# Patient Record
Sex: FEMALE | Race: WHITE | NOT HISPANIC OR LATINO | Employment: OTHER | URBAN - METROPOLITAN AREA
[De-identification: names, ages, dates, MRNs, and addresses within clinical notes are randomized per-mention and may not be internally consistent; named-entity substitution may affect disease eponyms.]

---

## 2017-11-12 ENCOUNTER — OFFICE VISIT (OUTPATIENT)
Dept: URGENT CARE | Facility: CLINIC | Age: 57
End: 2017-11-12
Payer: COMMERCIAL

## 2017-11-12 VITALS
BODY MASS INDEX: 40.97 KG/M2 | OXYGEN SATURATION: 97 % | HEIGHT: 64 IN | RESPIRATION RATE: 18 BRPM | DIASTOLIC BLOOD PRESSURE: 64 MMHG | SYSTOLIC BLOOD PRESSURE: 118 MMHG | WEIGHT: 240 LBS | HEART RATE: 75 BPM | TEMPERATURE: 97 F

## 2017-11-12 DIAGNOSIS — B34.9 ACUTE VIRAL SYNDROME: Primary | ICD-10-CM

## 2017-11-12 LAB
CTP QC/QA: YES
FLUAV AG NPH QL: NEGATIVE
FLUBV AG NPH QL: NEGATIVE

## 2017-11-12 PROCEDURE — 99203 OFFICE O/P NEW LOW 30 MIN: CPT | Mod: 25,S$GLB,, | Performed by: EMERGENCY MEDICINE

## 2017-11-12 PROCEDURE — 96372 THER/PROPH/DIAG INJ SC/IM: CPT | Mod: S$GLB,,, | Performed by: EMERGENCY MEDICINE

## 2017-11-12 PROCEDURE — 87804 INFLUENZA ASSAY W/OPTIC: CPT | Mod: QW,S$GLB,, | Performed by: EMERGENCY MEDICINE

## 2017-11-12 RX ORDER — LEVOTHYROXINE SODIUM 112 UG/1
112 TABLET ORAL DAILY
COMMUNITY
End: 2021-04-13

## 2017-11-12 RX ORDER — BETAMETHASONE SODIUM PHOSPHATE AND BETAMETHASONE ACETATE 3; 3 MG/ML; MG/ML
6 INJECTION, SUSPENSION INTRA-ARTICULAR; INTRALESIONAL; INTRAMUSCULAR; SOFT TISSUE
Status: COMPLETED | OUTPATIENT
Start: 2017-11-12 | End: 2017-11-12

## 2017-11-12 RX ADMIN — BETAMETHASONE SODIUM PHOSPHATE AND BETAMETHASONE ACETATE 6 MG: 3; 3 INJECTION, SUSPENSION INTRA-ARTICULAR; INTRALESIONAL; INTRAMUSCULAR; SOFT TISSUE at 01:11

## 2017-11-12 NOTE — PROGRESS NOTES
"Subjective:       Patient ID: Sally Jane is a 57 y.o. female.    Vitals:  height is 5' 4" (1.626 m) and weight is 108.9 kg (240 lb). Her oral temperature is 96.9 °F (36.1 °C). Her blood pressure is 118/64 and her pulse is 75. Her respiration is 18 and oxygen saturation is 97%.     Chief Complaint: Sinus Problem    Few d hx sinus drainage/congestion/pressure/right ear ache, NOC.  States steroid IM works well.      Sinus Problem   This is a new problem. The current episode started in the past 7 days. The problem is unchanged. There has been no fever. Her pain is at a severity of 10/10. Associated symptoms include congestion, coughing, ear pain, headaches and sinus pressure. Pertinent negatives include no chills, hoarse voice, shortness of breath or sore throat. Past treatments include oral decongestants. The treatment provided no relief.     Review of Systems   Constitution: Negative for chills, fever and malaise/fatigue.   HENT: Positive for congestion, ear pain and sinus pressure. Negative for hoarse voice and sore throat.    Eyes: Negative for discharge and redness.   Cardiovascular: Negative for chest pain, dyspnea on exertion and leg swelling.   Respiratory: Positive for cough. Negative for shortness of breath, sputum production and wheezing.    Musculoskeletal: Negative for myalgias.   Gastrointestinal: Negative for abdominal pain and nausea.   Neurological: Positive for headaches.       Objective:      Physical Exam   Constitutional: She is oriented to person, place, and time.   Overweight   HENT:   Head: Normocephalic and atraumatic.   Right Ear: External ear and ear canal normal. A middle ear effusion is present.   Left Ear: Tympanic membrane, external ear and ear canal normal.   Nose: Mucosal edema present. No rhinorrhea. Right sinus exhibits no maxillary sinus tenderness and no frontal sinus tenderness. Left sinus exhibits no maxillary sinus tenderness and no frontal sinus tenderness.   Mouth/Throat: " Uvula is midline, oropharynx is clear and moist and mucous membranes are normal.   Eyes: EOM are normal. Pupils are equal, round, and reactive to light.   Neck: Normal range of motion. Neck supple.   Cardiovascular: Normal rate, regular rhythm and normal heart sounds.    Pulmonary/Chest: Breath sounds normal.   Musculoskeletal: Normal range of motion.   Lymphadenopathy:     She has no cervical adenopathy.   Neurological: She is alert and oriented to person, place, and time.   Skin: Skin is warm and dry.   Psychiatric: She has a normal mood and affect. Her behavior is normal.       Assessment:       1. Acute viral syndrome        Plan:         Acute viral syndrome  -     POCT Influenza A/B  -     betamethasone acetate-betamethasone sodium phosphate injection 6 mg; Inject 1 mL (6 mg total) into the muscle one time.      Florian Arteaga MD  Go to the Emergency Department for any problems

## 2017-11-12 NOTE — LETTER
November 12, 2017      [facility]  [address] [address], [address]  [place] [address]  Phone: [phone]  Fax: [phone]       Patient: Sally Jane   YOB: 1960  Date of Visit: 11/12/2017    To Whom It May Concern:    Nic Jane  was at [facility] on 11/12/2017. She may return to work/school on 11/14/17, earlier if feels better with no restrictions. If you have any questions or concerns, or if I can be of further assistance, please do not hesitate to contact me.    Sincerely,          Florian Arteaga MD

## 2017-11-12 NOTE — PATIENT INSTRUCTIONS
"  Viral Syndrome (Adult)  A viral illness may cause a number of symptoms. The symptoms depend on the part of the body that the virus affects. If it settles in your nose, throat, and lungs, it may cause cough, sore throat, congestion, and sometimes headache. If it settles in your stomach and intestinal tract, it may cause vomiting and diarrhea. Sometimes it causes vague symptoms like "aching all over," feeling tired, loss of appetite, or fever.  A viral illness usually lasts 1 to 2 weeks, but sometimes it lasts longer. In some cases, a more serious infection can look like a viral syndrome in the first few days of the illness. You may need another exam and additional tests to know the difference. Watch for the warning signs listed below.  Home care  Follow these guidelines for taking care of yourself at home:  · If symptoms are severe, rest at home for the first 2 to 3 days.  · Stay away from cigarette smoke - both your smoke and the smoke from others.  · You may use over-the-counter acetaminophen or ibuprofen for fever, muscle aching, and headache, unless another medicine was prescribed for this. If you have chronic liver or kidney disease or ever had a stomach ulcer or GI bleeding, talk with your doctor before using these medicines. No one who is younger than 18 and ill with a fever should take aspirin. It may cause severe disease or death.  · Your appetite may be poor, so a light diet is fine. Avoid dehydration by drinking 8 to 12 8-ounce glasses of fluids each day. This may include water; orange juice; lemonade; apple, grape, and cranberry juice; clear fruit drinks; electrolyte replacement and sports drinks; and decaffeinated teas and coffee. If you have been diagnosed with a kidney disease, ask your doctor how much and what types of fluids you should drink to prevent dehydration. If you have kidney disease, drinking too much fluid can cause it build up in the your body and be dangerous to your " health.  · Over-the-counter remedies won't shorten the length of the illness but may be helpful for cough, sore throat; and nasal and sinus congestion. Don't use decongestants if you have high blood pressure.  Follow-up care  Follow up with your healthcare provider if you do not improve over the next week.  Call 911  Get emergency medical care if any of the following occur:  · Convulsion  · Feeling weak, dizzy, or like you are going to faint  · Chest pain, shortness of breath, wheezing, or difficulty breathing  When to seek medical advice  Call your healthcare provider right away if any of these occur:  · Cough with lots of colored sputum (mucus) or blood in your sputum  · Chest pain, shortness of breath, wheezing, or difficulty breathing  · Severe headache; face, neck, or ear pain  · Severe, constant pain in the lower right side of your belly (abdominal)  · Continued vomiting (cant keep liquids down)  · Frequent diarrhea (more than 5 times a day); blood (red or black color) or mucus in diarrhea  · Feeling weak, dizzy, or like you are going to faint  · Extreme thirst  · Fever of 100.4°F (38°C) or higher, or as directed by your healthcare provider  Date Last Reviewed: 9/25/2015  © 5391-9975 [employer]. [address] [place] [address], [place] [address]. All rights reserved. This information is not intended as a substitute for professional medical care. Always follow your healthcare professional's instructions.        Sinusitis (No Antibiotics)    The sinuses are air-filled spaces within the bones of the face. They connect to the inside of the nose. Sinusitis is an inflammation of the tissue lining the sinus cavity. Sinus inflammation can occur during a cold. It can also be due to allergies to pollens and other particles in the air. It can cause symptoms such as sinus congestion, headache, sore throat, facial swelling and fullness. It may also cause a low-grade fever. No infection is present, and no antibiotic  treatment is needed.  Home care  · Drink plenty of water, hot tea, and other liquids. This may help thin mucus. It also may promote sinus drainage.  · Heat may help soothe painful areas of the face. Use a towel soaked in hot water. Or,  the shower and direct the hot spray onto your face. Using a vaporizer along with a menthol rub at night may also help.   · An expectorant containing guaifenesin may help thin the mucus and promote drainage from the sinuses.  · Over-the-counter decongestants may be used unless a similar medicine was prescribed. Nasal sprays work the fastest. Use one that contains phenylephrine or oxymetazoline. First blow the nose gently. Then use the spray. Do not use these medicines more often than directed on the label or symptoms may get worse. You may also use tablets containing pseudoephedrine. Avoid products that combine ingredients, because side effects may be increased. Read labels. You can also ask the pharmacist for help. (NOTE: Persons with high blood pressure should not use decongestants. They can raise blood pressure.)  · Over-the-counter antihistamines may help if allergies contributed to your sinusitis.    · Use acetaminophen or ibuprofen to control pain, unless another pain medicine was prescribed. (If you have chronic liver or kidney disease or ever had a stomach ulcer, talk with your doctor before using these medicines. Aspirin should never be used in anyone under 18 years of age who is ill with a fever. It may cause severe liver damage.)  · Use nasal rinses or irrigation as instructed by your health care provider.  · Don't smoke. This can worsen symptoms.  Follow-up care  Follow up with your healthcare provider or our staff if you are not improving within the next week.  When to seek medical advice  Call your healthcare provider if any of these occur:  · Green or yellow discharge from the nose or into the throat  · Facial pain or headache becoming more severe  · Stiff  neck  · Unusual drowsiness or confusion  · Swelling of the forehead or eyelids  · Vision problems, including blurred or double vision  · Fever of 100.4ºF (38ºC) or higher, or as directed by your healthcare provider  · Seizure  · Breathing problems  · Symptoms not resolving within 10 days  Date Last Reviewed: 4/13/2015  © 8961-4679 [employer]. [address] [place] [address], [place] [address]. All rights reserved. This information is not intended as a substitute for professional medical care. Always follow your healthcare professional's instructions.        Earache, No Infection (Adult)  Earaches can happen without an infection. This occurs when air and fluid build up behind the eardrum causing a feeling of fullness and discomfort and reduced hearing. This is called otitis media with effusion (OME) or serous otitis media. It means there is fluid in the middle ear. It is not the same as acute otitis media, which is typically from infection.  OME can happen when you have a cold if congestion blocks the passage that drains the middle ear. This passage is called the eustachian tube. OME may also occur with nasal allergies or after a bacterial middle ear infection.    The pain or discomfort may come and go. You may hear clicking or popping sounds when you chew or swallow. You may feel that your balance is off. Or you may hear ringing in the ear.  It often takes from several weeks up to 3 months for the fluid to clear on its own. Oral pain relievers and ear drops help if there is pain. Decongestants and antihistamines sometimes help. Antibiotics don't help since there is no infection. Your doctor may prescribe a nasal spray to help reduce swelling in the nose and eustachian tube. This can allow the ear to drain.  If your OME doesn't improve after 3 months, surgery may be used to drain the fluid and insert a small tube in the eardrum to allow continued drainage.  Because the middle ear fluid can become infected, it  is important to watch for signs of an ear infection which may develop later. These signs include increased ear pain, fever, or drainage from the ear.  Home care  The following guidelines will help you care for yourself at home:  · You may use over-the-counter medicine as directed to control pain, unless another medicine was prescribed. If you have chronic liver or kidney disease or ever had a stomach ulcer or GI bleeding, talk with your doctor before using these medicines. Aspirin should never be used in anyone under 18 years of age who is ill with a fever. It may cause severe liver damage.  · You may use over-the-counter decongestants such as phenylephrine or pseudoephedrine. But they are not always helpful. Don't use nasal spray decongestants more than 3 days. Longer use can make congestion worse. Prescription nasal sprays from your doctor don't typically have those restrictions.  · Antihistamines may help if you are also having allergy symptoms.  · You may use medicines such as guaifenesin to thin mucus and promote drainage.  Follow-up care  Follow up with your healthcare provider or as advised if you are not feeling better after 3 days.  When to seek medical advice  Call your healthcare provider right away if any of the following occur:  · Your ear pain gets worse or does not start to improve   · Fever of 100.4°F (38°C) or higher, or as directed by your healthcare provider  · Fluid or blood draining from the ear  · Headache or sinus pain  · Stiff neck  · Unusual drowsiness or confusion  Date Last Reviewed: 10/1/2016  © 3303-0744 [employer]. [address] [place] [address], [place] [address]. All rights reserved. This information is not intended as a substitute for professional medical care. Always follow your healthcare professional's instructions.      Florian Arteaga MD  Go to the Emergency Department for any problems

## 2017-11-15 ENCOUNTER — TELEPHONE (OUTPATIENT)
Dept: URGENT CARE | Facility: CLINIC | Age: 57
End: 2017-11-15

## 2019-08-28 ENCOUNTER — OFFICE VISIT (OUTPATIENT)
Dept: URGENT CARE | Facility: CLINIC | Age: 59
End: 2019-08-28
Payer: COMMERCIAL

## 2019-08-28 VITALS
RESPIRATION RATE: 16 BRPM | BODY MASS INDEX: 29.02 KG/M2 | SYSTOLIC BLOOD PRESSURE: 120 MMHG | DIASTOLIC BLOOD PRESSURE: 67 MMHG | HEART RATE: 64 BPM | HEIGHT: 64 IN | TEMPERATURE: 98 F | WEIGHT: 170 LBS

## 2019-08-28 DIAGNOSIS — L30.9 DERMATITIS: Primary | ICD-10-CM

## 2019-08-28 PROCEDURE — 96372 THER/PROPH/DIAG INJ SC/IM: CPT | Mod: S$GLB,,, | Performed by: NURSE PRACTITIONER

## 2019-08-28 PROCEDURE — 99214 PR OFFICE/OUTPT VISIT, EST, LEVL IV, 30-39 MIN: ICD-10-PCS | Mod: 25,S$GLB,, | Performed by: NURSE PRACTITIONER

## 2019-08-28 PROCEDURE — 96372 PR INJECTION,THERAP/PROPH/DIAG2ST, IM OR SUBCUT: ICD-10-PCS | Mod: S$GLB,,, | Performed by: NURSE PRACTITIONER

## 2019-08-28 PROCEDURE — 99214 OFFICE O/P EST MOD 30 MIN: CPT | Mod: 25,S$GLB,, | Performed by: NURSE PRACTITIONER

## 2019-08-28 PROCEDURE — 3008F PR BODY MASS INDEX (BMI) DOCUMENTED: ICD-10-PCS | Mod: CPTII,S$GLB,, | Performed by: NURSE PRACTITIONER

## 2019-08-28 PROCEDURE — 3008F BODY MASS INDEX DOCD: CPT | Mod: CPTII,S$GLB,, | Performed by: NURSE PRACTITIONER

## 2019-08-28 RX ORDER — CHLORHEXIDINE GLUCONATE ORAL RINSE 1.2 MG/ML
SOLUTION DENTAL
Refills: 2 | COMMUNITY
Start: 2019-07-25 | End: 2021-04-13

## 2019-08-28 RX ORDER — VIT C/E/ZN/COPPR/LUTEIN/ZEAXAN 250MG-90MG
1000 CAPSULE ORAL EVERY MORNING
COMMUNITY
End: 2021-05-24

## 2019-08-28 RX ORDER — PREDNISONE 20 MG/1
TABLET ORAL
Qty: 9 TABLET | Refills: 0 | Status: SHIPPED | OUTPATIENT
Start: 2019-08-28 | End: 2019-09-04

## 2019-08-28 RX ORDER — LEVOTHYROXINE SODIUM 75 UG/1
75 TABLET ORAL
Refills: 3 | COMMUNITY
Start: 2019-07-24 | End: 2021-08-20 | Stop reason: SDUPTHER

## 2019-08-28 RX ORDER — FERROUS SULFATE, DRIED 160(50) MG
1 TABLET, EXTENDED RELEASE ORAL EVERY MORNING
COMMUNITY
End: 2021-08-20

## 2019-08-28 RX ORDER — DEXAMETHASONE SODIUM PHOSPHATE 100 MG/10ML
6 INJECTION INTRAMUSCULAR; INTRAVENOUS
Status: COMPLETED | OUTPATIENT
Start: 2019-08-28 | End: 2019-08-28

## 2019-08-28 RX ADMIN — DEXAMETHASONE SODIUM PHOSPHATE 6 MG: 100 INJECTION INTRAMUSCULAR; INTRAVENOUS at 05:08

## 2019-08-28 NOTE — PROGRESS NOTES
"Subjective:       Patient ID: Sally Jane is a 59 y.o. female.    Vitals:  height is 5' 4" (1.626 m) and weight is 77.1 kg (170 lb). Her oral temperature is 98.2 °F (36.8 °C). Her blood pressure is 120/67 and her pulse is 64. Her respiration is 16.     Chief Complaint: Rash    Patient states she was working in yard Saturday and noticed a rash on buttocks later. Rash seems to be spreading since yesterday.    Rash   This is a new problem. The current episode started in the past 7 days. The problem has been gradually worsening since onset. The affected locations include the right arm, left arm and abdomen (buttocks). The rash is characterized by redness, swelling and itchiness. It is unknown if there was an exposure to a precipitant. Associated symptoms include congestion. Pertinent negatives include no cough, diarrhea, fatigue, fever, shortness of breath, sore throat or vomiting. Past treatments include anti-itch cream (zyrtec). The treatment provided mild relief. There is no history of allergies.       Constitution: Negative for chills, fatigue and fever.   HENT: Positive for congestion. Negative for sore throat.    Neck: Negative for painful lymph nodes.   Cardiovascular: Negative for chest pain and leg swelling.   Eyes: Negative for double vision and blurred vision.   Respiratory: Negative for cough and shortness of breath.    Gastrointestinal: Negative for nausea, vomiting and diarrhea.   Genitourinary: Negative for dysuria, frequency, urgency and history of kidney stones.   Musculoskeletal: Negative for joint pain, joint swelling, muscle cramps and muscle ache.   Skin: Positive for rash. Negative for color change, pale, erythema and bruising.   Allergic/Immunologic: Negative for seasonal allergies.   Neurological: Negative for dizziness, history of vertigo, light-headedness, passing out and headaches.   Hematologic/Lymphatic: Negative for swollen lymph nodes.   Psychiatric/Behavioral: Negative for " nervous/anxious, sleep disturbance and depression. The patient is not nervous/anxious.        Objective:      Physical Exam   Constitutional: She is oriented to person, place, and time. She appears well-developed and well-nourished.   HENT:   Head: Normocephalic and atraumatic. Head is without abrasion, without contusion and without laceration.   Right Ear: External ear normal.   Left Ear: External ear normal.   Nose: Nose normal.   Mouth/Throat: Oropharynx is clear and moist.   Eyes: Pupils are equal, round, and reactive to light. Conjunctivae, EOM and lids are normal.   Neck: Trachea normal, full passive range of motion without pain and phonation normal. Neck supple.   Cardiovascular: Normal rate, regular rhythm, S1 normal, S2 normal and normal heart sounds.  No extrasystoles are present.   Pulmonary/Chest: Effort normal and breath sounds normal. No stridor. No respiratory distress.   Musculoskeletal: Normal range of motion.   Lymphadenopathy:     She has no cervical adenopathy.        Right cervical: No superficial cervical, no deep cervical and no posterior cervical adenopathy present.       Left cervical: No superficial cervical, no deep cervical and no posterior cervical adenopathy present.   Neurological: She is alert and oriented to person, place, and time.   Skin: Skin is warm, dry and intact. Capillary refill takes less than 2 seconds. Rash (trunk, bilateral lower arms, neck) noted. No abrasion, no bruising, no burn, no ecchymosis, no laceration and no lesion noted. Rash is maculopapular. No erythema.   Psychiatric: She has a normal mood and affect. Her speech is normal and behavior is normal. Judgment and thought content normal. Cognition and memory are normal.   Nursing note and vitals reviewed.      Assessment:       No diagnosis found.    Plan:         There are no diagnoses linked to this encounter.

## 2019-08-28 NOTE — PATIENT INSTRUCTIONS
Please follow up with your Primary care provider within 2-5 days if your signs and symptoms have not resolved or worsen.     If your condition worsens or fails to improve we recommend that you receive another evaluation at the emergency room immediately or contact your primary medical clinic to discuss your concerns.    You must understand that you have received an Urgent Care treatment only and that you may be released before all of your medical problems are known or treated.   You, the patient, will arrange for follow up care as instructed.       Skin Rash    If your condition worsens or fails to improve we recommend that you receive another evaluation at the ER immediately or contact your PCP to discuss your concerns or return here.     If you develop additional symptoms such as tongue, mouth or throat swelling or any  trouble breathing go immediately to the ER or call 911.     You must understand that you've received an urgent care treatment only and that you may be released before all your medical problems are known or treated. You the patient will arrange for followup care as instructed.     Zyrtec, Claritin, Xyzal or Allegra should be used daily for the next 7-10 days to prevent or suppress the itching. You can take Benedryl 25 mg at bedtime as well.     Steroid Use    If you had a steroid injection in the clinic and you were given an oral steroid today, do not start the oral prescription until tomorrow.     As discussed, steroid use can elevate your blood pressure, elevate your blood sugar, water weight gain, nervous energy, redness to the face and dimpling of the skin at the injection site.     Oral steroids are hard on the stomach and should always be taken with food.      Once starting a steroid pack, you should complete it as directed.  If you abruptly discontinue the steroid, contact your PCP prior to doing this.      Please do not scratch the areas as they can become infected.  If you notice puss,  fever, redness, increased warmth or red streaking from the area, please see your PCP or Urgent care as soon as possible.          Nonspecific Dermatitis  Dermatitis is a skin rash caused by something that touches the skin and makes it irritated and inflamed.  Your skin may be red, swollen, dry, and may be cracked. Blisters may form and ooze. The rash will itch.  Dermatitis can form on the face and neck, backs of hands, forearms, genitals, and lower legs. Dermatitis is not passed from person to person.  Talk with your health care provider about what may have caused the rash. Common things that cause skin allergies are metal in jewelry, plants like poison ivy or poison oak, and certain skin care products. You will need to avoid the source of your rash in the future to prevent it from coming back. In some cases, the cause of the dermatitis may not be found.  Treatment is done to relieve itching and prevent the rash from coming back. The rash should go away in a few days to a few weeks.  Home care  The health care provider may prescribe medications to relieve swelling and itching. Follow all instructions when using these medications.  · Avoid anything that heats up your skin, such as hot showers or baths, or direct sunlight. This can make itching worse.  · Stay away from whatever you think caused the rash.  · Bathe in warm, not hot, water. Apply a moisturizing lotion after bathing to prevent dry skin.  · Avoid skin irritants such as wool or silk clothing, grease, oils, harsh soaps, and detergents.  · Apply cold compresses to soothe your sores to help relieve your symptoms. Do this for 30 minutes 3 to 4 times a day. You can make a cold compress by soaking a cloth in cold water. Squeeze out excess water. You can add colloidal oatmeal to the water to help reduce itching. For severe itching in a small area, apply an ice pack wrapped in a thin towel. Do this for 20 minutes 3 to 4 times a day.  · You can also help relieve  large areas of itching by taking a lukewarm bath with colloidal oatmeal added to the water.  · Use hydrocortisone cream for redness and irritation, unless another medicine was prescribed. You can also use benzocaine anesthetic cream or spray.  · Use oral diphenhydramine to help reduce itching. This is an antihistamine you can buy at drug and grocery stores. It can make you sleepy, so use lower doses during the daytime. Or you can use loratadine. This is an antihistamine that will not make you sleepy. Dont use diphenhydramine if you have glaucoma or have trouble urinating because of an enlarged prostate.  · Wash your hands or use an antibacterial gel often to prevent the spread of the rash.  Follow-up care  Follow up with your health care provider. Make an appointment with your health care provider if your symptoms do not get better in the next 1 to 2 weeks.  When to seek medical advice  Call your health care provider right away if any of these occur:  · Spreading of the rash to other parts of your body  · Severe swelling of your face, eyelids, mouth, throat or tongue  · Trouble urinating due to swelling in the genital area  · Fever of 100.4°F (38°C) or higher  · Redness or swelling that gets worse  · Pain that gets worse  · Foul-smelling fluid leaking from the skin  · Yellow-brown crusts on the open blisters  · Joint pain   Date Last Reviewed: 7/23/2014  © 8027-1132 [employer]. [address] [place] [address], [place] [id number]. All rights reserved. This information is not intended as a substitute for professional medical care. Always follow your healthcare professional's instructions.

## 2019-08-31 ENCOUNTER — TELEPHONE (OUTPATIENT)
Dept: URGENT CARE | Facility: CLINIC | Age: 59
End: 2019-08-31

## 2019-08-31 NOTE — TELEPHONE ENCOUNTER
Call back [employer] 8/28/2019- Woman stated that she is no longer employed there and the number we have is her employment number. No other number listed.

## 2019-09-14 ENCOUNTER — OFFICE VISIT (OUTPATIENT)
Dept: URGENT CARE | Facility: CLINIC | Age: 59
End: 2019-09-14
Payer: COMMERCIAL

## 2019-09-14 VITALS
HEART RATE: 68 BPM | DIASTOLIC BLOOD PRESSURE: 70 MMHG | SYSTOLIC BLOOD PRESSURE: 128 MMHG | BODY MASS INDEX: 29.02 KG/M2 | OXYGEN SATURATION: 98 % | HEIGHT: 64 IN | TEMPERATURE: 98 F | WEIGHT: 170 LBS

## 2019-09-14 DIAGNOSIS — L30.9 DERMATITIS: Primary | ICD-10-CM

## 2019-09-14 PROCEDURE — 99214 PR OFFICE/OUTPT VISIT, EST, LEVL IV, 30-39 MIN: ICD-10-PCS | Mod: S$GLB,,, | Performed by: PHYSICIAN ASSISTANT

## 2019-09-14 PROCEDURE — 3008F BODY MASS INDEX DOCD: CPT | Mod: CPTII,S$GLB,, | Performed by: PHYSICIAN ASSISTANT

## 2019-09-14 PROCEDURE — 3008F PR BODY MASS INDEX (BMI) DOCUMENTED: ICD-10-PCS | Mod: CPTII,S$GLB,, | Performed by: PHYSICIAN ASSISTANT

## 2019-09-14 PROCEDURE — 99214 OFFICE O/P EST MOD 30 MIN: CPT | Mod: S$GLB,,, | Performed by: PHYSICIAN ASSISTANT

## 2019-09-14 RX ORDER — HYDROXYZINE HYDROCHLORIDE 25 MG/1
25 TABLET, FILM COATED ORAL 3 TIMES DAILY PRN
Qty: 15 TABLET | Refills: 0 | Status: SHIPPED | OUTPATIENT
Start: 2019-09-14 | End: 2019-09-19

## 2019-09-14 RX ORDER — TRIAMCINOLONE ACETONIDE 1 MG/G
OINTMENT TOPICAL 2 TIMES DAILY
Qty: 1 BOTTLE | Refills: 0 | Status: SHIPPED | OUTPATIENT
Start: 2019-09-14 | End: 2019-09-14 | Stop reason: CLARIF

## 2019-09-14 RX ORDER — HYDROXYZINE HYDROCHLORIDE 25 MG/1
25 TABLET, FILM COATED ORAL 3 TIMES DAILY PRN
Qty: 15 TABLET | Refills: 0 | Status: SHIPPED | OUTPATIENT
Start: 2019-09-14 | End: 2019-09-14

## 2019-09-14 RX ORDER — TRIAMCINOLONE ACETONIDE 1 MG/G
OINTMENT TOPICAL 2 TIMES DAILY
Qty: 1 BOTTLE | Refills: 0 | Status: SHIPPED | OUTPATIENT
Start: 2019-09-14 | End: 2021-04-13

## 2019-09-14 RX ORDER — TRIAMCINOLONE ACETONIDE 1 MG/G
OINTMENT TOPICAL 2 TIMES DAILY
Qty: 1 BOTTLE | Refills: 0 | Status: SHIPPED | OUTPATIENT
Start: 2019-09-14 | End: 2019-09-14

## 2019-09-14 NOTE — PATIENT INSTRUCTIONS
Nonspecific Dermatitis  Dermatitis is a skin rash caused by something that touches the skin and makes it irritated and inflamed.  Your skin may be red, swollen, dry, and may be cracked. Blisters may form and ooze. The rash will itch.  Dermatitis can form on the face and neck, backs of hands, forearms, genitals, and lower legs. Dermatitis is not passed from person to person.  Talk with your health care provider about what may have caused the rash. Common things that cause skin allergies are metal in jewelry, plants like poison ivy or poison oak, and certain skin care products. You will need to avoid the source of your rash in the future to prevent it from coming back. In some cases, the cause of the dermatitis may not be found.  Treatment is done to relieve itching and prevent the rash from coming back. The rash should go away in a few days to a few weeks.  Home care  The health care provider may prescribe medications to relieve swelling and itching. Follow all instructions when using these medications.  · Avoid anything that heats up your skin, such as hot showers or baths, or direct sunlight. This can make itching worse.  · Stay away from whatever you think caused the rash.  · Bathe in warm, not hot, water. Apply a moisturizing lotion after bathing to prevent dry skin.  · Avoid skin irritants such as wool or silk clothing, grease, oils, harsh soaps, and detergents.  · Apply cold compresses to soothe your sores to help relieve your symptoms. Do this for 30 minutes 3 to 4 times a day. You can make a cold compress by soaking a cloth in cold water. Squeeze out excess water. You can add colloidal oatmeal to the water to help reduce itching. For severe itching in a small area, apply an ice pack wrapped in a thin towel. Do this for 20 minutes 3 to 4 times a day.  · You can also help relieve large areas of itching by taking a lukewarm bath with colloidal oatmeal added to the water.  · Use hydrocortisone cream for redness  and irritation, unless another medicine was prescribed. You can also use benzocaine anesthetic cream or spray.  · Use oral diphenhydramine to help reduce itching. This is an antihistamine you can buy at drug and grocery stores. It can make you sleepy, so use lower doses during the daytime. Or you can use loratadine. This is an antihistamine that will not make you sleepy. Dont use diphenhydramine if you have glaucoma or have trouble urinating because of an enlarged prostate.  · Wash your hands or use an antibacterial gel often to prevent the spread of the rash.  Follow-up care  Follow up with your health care provider. Make an appointment with your health care provider if your symptoms do not get better in the next 1 to 2 weeks.  When to seek medical advice  Call your health care provider right away if any of these occur:  · Spreading of the rash to other parts of your body  · Severe swelling of your face, eyelids, mouth, throat or tongue  · Trouble urinating due to swelling in the genital area  · Fever of 100.4°F (38°C) or higher  · Redness or swelling that gets worse  · Pain that gets worse  · Foul-smelling fluid leaking from the skin  · Yellow-brown crusts on the open blisters  · Joint pain   Date Last Reviewed: 7/23/2014  © 9894-5478 [employer]. [address] [place] [address], [place] [address]. All rights reserved. This information is not intended as a substitute for professional medical care. Always follow your healthcare professional's instructions.
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
## 2021-03-18 ENCOUNTER — IMMUNIZATION (OUTPATIENT)
Dept: PRIMARY CARE CLINIC | Facility: CLINIC | Age: 61
End: 2021-03-18
Payer: COMMERCIAL

## 2021-03-18 DIAGNOSIS — Z23 NEED FOR VACCINATION: Primary | ICD-10-CM

## 2021-03-18 PROCEDURE — 91300 COVID-19, MRNA, LNP-S, PF, 30 MCG/0.3 ML DOSE VACCINE: CPT | Mod: S$GLB,,, | Performed by: FAMILY MEDICINE

## 2021-03-18 PROCEDURE — 91300 COVID-19, MRNA, LNP-S, PF, 30 MCG/0.3 ML DOSE VACCINE: ICD-10-PCS | Mod: S$GLB,,, | Performed by: FAMILY MEDICINE

## 2021-03-18 PROCEDURE — 0001A COVID-19, MRNA, LNP-S, PF, 30 MCG/0.3 ML DOSE VACCINE: CPT | Mod: CV19,S$GLB,, | Performed by: FAMILY MEDICINE

## 2021-03-18 PROCEDURE — 0001A COVID-19, MRNA, LNP-S, PF, 30 MCG/0.3 ML DOSE VACCINE: ICD-10-PCS | Mod: CV19,S$GLB,, | Performed by: FAMILY MEDICINE

## 2021-04-08 ENCOUNTER — IMMUNIZATION (OUTPATIENT)
Dept: PRIMARY CARE CLINIC | Facility: CLINIC | Age: 61
End: 2021-04-08
Payer: COMMERCIAL

## 2021-04-08 DIAGNOSIS — Z23 NEED FOR VACCINATION: Primary | ICD-10-CM

## 2021-04-08 PROCEDURE — 0002A COVID-19, MRNA, LNP-S, PF, 30 MCG/0.3 ML DOSE VACCINE: ICD-10-PCS | Mod: CV19,S$GLB,, | Performed by: FAMILY MEDICINE

## 2021-04-08 PROCEDURE — 91300 COVID-19, MRNA, LNP-S, PF, 30 MCG/0.3 ML DOSE VACCINE: CPT | Mod: S$GLB,,, | Performed by: FAMILY MEDICINE

## 2021-04-08 PROCEDURE — 91300 COVID-19, MRNA, LNP-S, PF, 30 MCG/0.3 ML DOSE VACCINE: ICD-10-PCS | Mod: S$GLB,,, | Performed by: FAMILY MEDICINE

## 2021-04-08 PROCEDURE — 0002A COVID-19, MRNA, LNP-S, PF, 30 MCG/0.3 ML DOSE VACCINE: CPT | Mod: CV19,S$GLB,, | Performed by: FAMILY MEDICINE

## 2021-04-13 ENCOUNTER — OFFICE VISIT (OUTPATIENT)
Dept: INTERNAL MEDICINE | Facility: CLINIC | Age: 61
End: 2021-04-13
Payer: COMMERCIAL

## 2021-04-13 VITALS
TEMPERATURE: 98 F | HEIGHT: 61 IN | WEIGHT: 198.88 LBS | BODY MASS INDEX: 37.55 KG/M2 | HEART RATE: 71 BPM | SYSTOLIC BLOOD PRESSURE: 128 MMHG | DIASTOLIC BLOOD PRESSURE: 78 MMHG

## 2021-04-13 DIAGNOSIS — R73.03 PREDIABETES: ICD-10-CM

## 2021-04-13 DIAGNOSIS — E03.9 HYPOTHYROIDISM, UNSPECIFIED TYPE: ICD-10-CM

## 2021-04-13 DIAGNOSIS — E66.01 CLASS 2 SEVERE OBESITY DUE TO EXCESS CALORIES WITH SERIOUS COMORBIDITY AND BODY MASS INDEX (BMI) OF 37.0 TO 37.9 IN ADULT: ICD-10-CM

## 2021-04-13 DIAGNOSIS — L24.7 IRRITANT CONTACT DERMATITIS DUE TO PLANTS, EXCEPT FOOD: Primary | ICD-10-CM

## 2021-04-13 DIAGNOSIS — Z98.84 BARIATRIC SURGERY STATUS: ICD-10-CM

## 2021-04-13 DIAGNOSIS — Z78.0 ENCOUNTER FOR OSTEOPOROSIS SCREENING IN ASYMPTOMATIC POSTMENOPAUSAL PATIENT: ICD-10-CM

## 2021-04-13 DIAGNOSIS — Z12.31 BREAST CANCER SCREENING BY MAMMOGRAM: ICD-10-CM

## 2021-04-13 DIAGNOSIS — R53.81 CHRONIC FATIGUE AND MALAISE: ICD-10-CM

## 2021-04-13 DIAGNOSIS — Z13.820 ENCOUNTER FOR OSTEOPOROSIS SCREENING IN ASYMPTOMATIC POSTMENOPAUSAL PATIENT: ICD-10-CM

## 2021-04-13 DIAGNOSIS — R53.82 CHRONIC FATIGUE AND MALAISE: ICD-10-CM

## 2021-04-13 PROBLEM — N81.10 CYSTOCELE WITH RECTOCELE: Status: ACTIVE | Noted: 2020-06-05

## 2021-04-13 PROBLEM — N81.6 CYSTOCELE WITH RECTOCELE: Status: ACTIVE | Noted: 2020-06-05

## 2021-04-13 PROBLEM — E66.812 CLASS 2 SEVERE OBESITY DUE TO EXCESS CALORIES WITH SERIOUS COMORBIDITY AND BODY MASS INDEX (BMI) OF 37.0 TO 37.9 IN ADULT: Status: ACTIVE | Noted: 2021-04-13

## 2021-04-13 PROCEDURE — 96372 THER/PROPH/DIAG INJ SC/IM: CPT | Mod: S$GLB,,, | Performed by: INTERNAL MEDICINE

## 2021-04-13 PROCEDURE — 99214 OFFICE O/P EST MOD 30 MIN: CPT | Mod: 25,S$GLB,, | Performed by: INTERNAL MEDICINE

## 2021-04-13 PROCEDURE — 96372 PR INJECTION,THERAP/PROPH/DIAG2ST, IM OR SUBCUT: ICD-10-PCS | Mod: S$GLB,,, | Performed by: INTERNAL MEDICINE

## 2021-04-13 PROCEDURE — 99214 PR OFFICE/OUTPT VISIT, EST, LEVL IV, 30-39 MIN: ICD-10-PCS | Mod: 25,S$GLB,, | Performed by: INTERNAL MEDICINE

## 2021-04-13 RX ORDER — TRIAMCINOLONE ACETONIDE 1 MG/G
OINTMENT TOPICAL 2 TIMES DAILY
Qty: 80 G | Refills: 3 | Status: SHIPPED | OUTPATIENT
Start: 2021-04-13 | End: 2021-05-24

## 2021-04-13 RX ORDER — DEXAMETHASONE SODIUM PHOSPHATE 4 MG/ML
4 INJECTION, SOLUTION INTRA-ARTICULAR; INTRALESIONAL; INTRAMUSCULAR; INTRAVENOUS; SOFT TISSUE ONCE
Status: COMPLETED | OUTPATIENT
Start: 2021-04-13 | End: 2021-04-13

## 2021-04-13 RX ORDER — DIPHENHYDRAMINE HCL 50 MG
50 CAPSULE ORAL EVERY 6 HOURS PRN
Qty: 30 CAPSULE | Refills: 0 | Status: SHIPPED | OUTPATIENT
Start: 2021-04-13 | End: 2021-05-24

## 2021-04-13 RX ORDER — METHYLPREDNISOLONE 4 MG/1
TABLET ORAL
Qty: 1 PACKAGE | Refills: 0 | Status: ON HOLD | OUTPATIENT
Start: 2021-04-13 | End: 2021-06-30 | Stop reason: HOSPADM

## 2021-04-13 RX ADMIN — DEXAMETHASONE SODIUM PHOSPHATE 4 MG: 4 INJECTION, SOLUTION INTRA-ARTICULAR; INTRALESIONAL; INTRAMUSCULAR; INTRAVENOUS; SOFT TISSUE at 09:04

## 2021-04-14 DIAGNOSIS — Z11.59 NEED FOR HEPATITIS C SCREENING TEST: ICD-10-CM

## 2021-05-05 LAB
HCV AB S/CO SERPL IA: 0.01
HCV AB SERPL QL IA: NORMAL
HIV 1+2 AB+HIV1 P24 AG SERPL QL IA: NORMAL

## 2021-05-20 ENCOUNTER — OFFICE VISIT (OUTPATIENT)
Dept: OPHTHALMOLOGY | Facility: CLINIC | Age: 61
End: 2021-05-20
Payer: COMMERCIAL

## 2021-05-20 DIAGNOSIS — C44.99 MUCINOUS CARCINOMA OF SKIN: Primary | ICD-10-CM

## 2021-05-20 PROCEDURE — 99999 PR PBB SHADOW E&M-EST. PATIENT-LVL III: CPT | Mod: PBBFAC,,, | Performed by: OPHTHALMOLOGY

## 2021-05-20 PROCEDURE — 99999 PR PBB SHADOW E&M-EST. PATIENT-LVL III: ICD-10-PCS | Mod: PBBFAC,,, | Performed by: OPHTHALMOLOGY

## 2021-05-20 PROCEDURE — 99204 OFFICE O/P NEW MOD 45 MIN: CPT | Mod: S$GLB,,, | Performed by: OPHTHALMOLOGY

## 2021-05-20 PROCEDURE — 99204 PR OFFICE/OUTPT VISIT, NEW, LEVL IV, 45-59 MIN: ICD-10-PCS | Mod: S$GLB,,, | Performed by: OPHTHALMOLOGY

## 2021-05-24 ENCOUNTER — TELEPHONE (OUTPATIENT)
Dept: OPHTHALMOLOGY | Facility: CLINIC | Age: 61
End: 2021-05-24

## 2021-05-24 ENCOUNTER — OFFICE VISIT (OUTPATIENT)
Dept: INTERNAL MEDICINE | Facility: CLINIC | Age: 61
End: 2021-05-24
Payer: COMMERCIAL

## 2021-05-24 ENCOUNTER — TELEPHONE (OUTPATIENT)
Dept: DERMATOLOGY | Facility: CLINIC | Age: 61
End: 2021-05-24

## 2021-05-24 VITALS
SYSTOLIC BLOOD PRESSURE: 117 MMHG | HEART RATE: 56 BPM | DIASTOLIC BLOOD PRESSURE: 77 MMHG | TEMPERATURE: 98 F | WEIGHT: 201.75 LBS | HEIGHT: 62 IN | BODY MASS INDEX: 37.13 KG/M2

## 2021-05-24 DIAGNOSIS — Z00.01 ENCOUNTER FOR GENERAL ADULT MEDICAL EXAMINATION WITH ABNORMAL FINDINGS: Primary | ICD-10-CM

## 2021-05-24 DIAGNOSIS — E66.01 CLASS 2 SEVERE OBESITY DUE TO EXCESS CALORIES WITH SERIOUS COMORBIDITY AND BODY MASS INDEX (BMI) OF 37.0 TO 37.9 IN ADULT: ICD-10-CM

## 2021-05-24 DIAGNOSIS — C44.99 MUCINOUS CARCINOMA OF SKIN: ICD-10-CM

## 2021-05-24 DIAGNOSIS — Z98.84 BARIATRIC SURGERY STATUS: ICD-10-CM

## 2021-05-24 DIAGNOSIS — R73.03 PREDIABETES: ICD-10-CM

## 2021-05-24 DIAGNOSIS — E03.9 HYPOTHYROIDISM, UNSPECIFIED TYPE: ICD-10-CM

## 2021-05-24 PROCEDURE — 99396 PREV VISIT EST AGE 40-64: CPT | Mod: S$GLB,,, | Performed by: INTERNAL MEDICINE

## 2021-05-24 PROCEDURE — 99396 PR PREVENTIVE VISIT,EST,40-64: ICD-10-PCS | Mod: S$GLB,,, | Performed by: INTERNAL MEDICINE

## 2021-05-24 RX ORDER — EZETIMIBE 10 MG/1
10 TABLET ORAL NIGHTLY
COMMUNITY
Start: 2021-04-19 | End: 2021-08-20 | Stop reason: SDUPTHER

## 2021-05-25 ENCOUNTER — HOSPITAL ENCOUNTER (OUTPATIENT)
Dept: RADIOLOGY | Facility: HOSPITAL | Age: 61
Discharge: HOME | End: 2021-05-25
Attending: INTERNAL MEDICINE
Payer: COMMERCIAL

## 2021-05-25 DIAGNOSIS — Z12.31 BREAST CANCER SCREENING BY MAMMOGRAM: ICD-10-CM

## 2021-05-25 PROCEDURE — 77067 MAMMO DIGITAL SCREENING BILAT WITH TOMO: ICD-10-PCS | Mod: 26,,, | Performed by: RADIOLOGY

## 2021-05-25 PROCEDURE — 77063 MAMMO DIGITAL SCREENING BILAT WITH TOMO: ICD-10-PCS | Mod: 26,,, | Performed by: RADIOLOGY

## 2021-05-25 PROCEDURE — 77063 BREAST TOMOSYNTHESIS BI: CPT | Mod: 26,,, | Performed by: RADIOLOGY

## 2021-05-25 PROCEDURE — 77067 SCR MAMMO BI INCL CAD: CPT | Mod: TC,PO

## 2021-05-25 PROCEDURE — 77067 SCR MAMMO BI INCL CAD: CPT | Mod: 26,,, | Performed by: RADIOLOGY

## 2021-05-31 ENCOUNTER — TELEPHONE (OUTPATIENT)
Dept: OPHTHALMOLOGY | Facility: CLINIC | Age: 61
End: 2021-05-31

## 2021-05-31 DIAGNOSIS — C44.99 MUCINOUS CARCINOMA OF SKIN: Primary | ICD-10-CM

## 2021-06-07 ENCOUNTER — TELEPHONE (OUTPATIENT)
Dept: OPHTHALMOLOGY | Facility: CLINIC | Age: 61
End: 2021-06-07

## 2021-06-08 ENCOUNTER — TELEPHONE (OUTPATIENT)
Dept: OPHTHALMOLOGY | Facility: CLINIC | Age: 61
End: 2021-06-08

## 2021-06-24 ENCOUNTER — TELEPHONE (OUTPATIENT)
Dept: OPHTHALMOLOGY | Facility: CLINIC | Age: 61
End: 2021-06-24

## 2021-06-29 ENCOUNTER — TELEPHONE (OUTPATIENT)
Dept: DERMATOLOGY | Facility: CLINIC | Age: 61
End: 2021-06-29

## 2021-06-30 ENCOUNTER — ANESTHESIA (OUTPATIENT)
Dept: SURGERY | Facility: HOSPITAL | Age: 61
DRG: 115 | End: 2021-06-30
Payer: COMMERCIAL

## 2021-06-30 ENCOUNTER — ANESTHESIA EVENT (OUTPATIENT)
Dept: SURGERY | Facility: HOSPITAL | Age: 61
DRG: 115 | End: 2021-06-30
Payer: COMMERCIAL

## 2021-06-30 ENCOUNTER — HOSPITAL ENCOUNTER (INPATIENT)
Facility: HOSPITAL | Age: 61
LOS: 1 days | Discharge: HOME | DRG: 115 | End: 2021-06-30
Attending: OPHTHALMOLOGY | Admitting: HOSPITALIST
Payer: COMMERCIAL

## 2021-06-30 VITALS
BODY MASS INDEX: 36.32 KG/M2 | HEART RATE: 51 BPM | HEIGHT: 64 IN | RESPIRATION RATE: 14 BRPM | DIASTOLIC BLOOD PRESSURE: 68 MMHG | OXYGEN SATURATION: 99 % | SYSTOLIC BLOOD PRESSURE: 140 MMHG | WEIGHT: 212.75 LBS | TEMPERATURE: 98 F

## 2021-06-30 DIAGNOSIS — C44.99 MUCINOUS CARCINOMA OF SKIN: Primary | ICD-10-CM

## 2021-06-30 PROCEDURE — 25000003 PHARM REV CODE 250

## 2021-06-30 PROCEDURE — 25000003 PHARM REV CODE 250: Performed by: STUDENT IN AN ORGANIZED HEALTH CARE EDUCATION/TRAINING PROGRAM

## 2021-06-30 PROCEDURE — D9220A PRA ANESTHESIA: Mod: ,,, | Performed by: ANESTHESIOLOGY

## 2021-06-30 PROCEDURE — 99222 1ST HOSP IP/OBS MODERATE 55: CPT | Mod: ,,, | Performed by: HOSPITALIST

## 2021-06-30 PROCEDURE — 25000003 PHARM REV CODE 250: Performed by: OPHTHALMOLOGY

## 2021-06-30 PROCEDURE — 67966 REVISION OF EYELID: CPT | Mod: LT,,, | Performed by: OPHTHALMOLOGY

## 2021-06-30 PROCEDURE — 88332 PATH CONSLTJ SURG EA ADD BLK: CPT | Mod: 59 | Performed by: PATHOLOGY

## 2021-06-30 PROCEDURE — D9220A PRA ANESTHESIA: ICD-10-PCS | Mod: ,,, | Performed by: ANESTHESIOLOGY

## 2021-06-30 PROCEDURE — 71000015 HC POSTOP RECOV 1ST HR: Performed by: OPHTHALMOLOGY

## 2021-06-30 PROCEDURE — 27200651 HC AIRWAY, LMA: Performed by: ANESTHESIOLOGY

## 2021-06-30 PROCEDURE — 37000008 HC ANESTHESIA 1ST 15 MINUTES: Performed by: OPHTHALMOLOGY

## 2021-06-30 PROCEDURE — 71000044 HC DOSC ROUTINE RECOVERY FIRST HOUR: Performed by: OPHTHALMOLOGY

## 2021-06-30 PROCEDURE — 88305 TISSUE EXAM BY PATHOLOGIST: CPT | Performed by: PATHOLOGY

## 2021-06-30 PROCEDURE — 67950 PR CANTHOPLASTY: ICD-10-PCS | Mod: 51,LT,, | Performed by: OPHTHALMOLOGY

## 2021-06-30 PROCEDURE — 67966 PR REVISION OF EYELID,> 1/4 LID MARGIN: ICD-10-PCS | Mod: LT,,, | Performed by: OPHTHALMOLOGY

## 2021-06-30 PROCEDURE — 63600175 PHARM REV CODE 636 W HCPCS: Performed by: STUDENT IN AN ORGANIZED HEALTH CARE EDUCATION/TRAINING PROGRAM

## 2021-06-30 PROCEDURE — 36000707: Performed by: OPHTHALMOLOGY

## 2021-06-30 PROCEDURE — 67950 REVISION OF EYELID: CPT | Mod: 51,LT,, | Performed by: OPHTHALMOLOGY

## 2021-06-30 PROCEDURE — 99222 PR INITIAL HOSPITAL CARE,LEVL II: ICD-10-PCS | Mod: ,,, | Performed by: HOSPITALIST

## 2021-06-30 PROCEDURE — 11000001 HC ACUTE MED/SURG PRIVATE ROOM

## 2021-06-30 PROCEDURE — 37000009 HC ANESTHESIA EA ADD 15 MINS: Performed by: OPHTHALMOLOGY

## 2021-06-30 PROCEDURE — 36000706: Performed by: OPHTHALMOLOGY

## 2021-06-30 PROCEDURE — 88331 PATH CONSLTJ SURG 1 BLK 1SPC: CPT | Performed by: PATHOLOGY

## 2021-06-30 RX ORDER — NEOMYCIN SULFATE, POLYMYXIN B SULFATE, AND DEXAMETHASONE 3.5; 10000; 1 MG/G; [USP'U]/G; MG/G
OINTMENT OPHTHALMIC EVERY 8 HOURS
Qty: 3.5 G | Refills: 0 | Status: SHIPPED | OUTPATIENT
Start: 2021-06-30 | End: 2021-08-20

## 2021-06-30 RX ORDER — TOBRAMYCIN AND DEXAMETHASONE 3; 1 MG/ML; MG/ML
SUSPENSION/ DROPS OPHTHALMIC
Status: DISCONTINUED | OUTPATIENT
Start: 2021-06-30 | End: 2021-06-30 | Stop reason: HOSPADM

## 2021-06-30 RX ORDER — LEVOTHYROXINE SODIUM 75 UG/1
75 TABLET ORAL
Status: DISCONTINUED | OUTPATIENT
Start: 2021-07-01 | End: 2021-06-30 | Stop reason: HOSPADM

## 2021-06-30 RX ORDER — LIDOCAINE HCL/EPINEPHRINE/PF 2%-1:200K
VIAL (ML) INJECTION
Status: DISCONTINUED | OUTPATIENT
Start: 2021-06-30 | End: 2021-06-30 | Stop reason: HOSPADM

## 2021-06-30 RX ORDER — BUPIVACAINE HYDROCHLORIDE 5 MG/ML
INJECTION, SOLUTION EPIDURAL; INTRACAUDAL
Status: DISCONTINUED | OUTPATIENT
Start: 2021-06-30 | End: 2021-06-30 | Stop reason: HOSPADM

## 2021-06-30 RX ORDER — LIDOCAINE HYDROCHLORIDE 20 MG/ML
INJECTION, SOLUTION EPIDURAL; INFILTRATION; INTRACAUDAL; PERINEURAL
Status: DISCONTINUED | OUTPATIENT
Start: 2021-06-30 | End: 2021-06-30

## 2021-06-30 RX ORDER — TOBRAMYCIN AND DEXAMETHASONE 3; 1 MG/ML; MG/ML
SUSPENSION/ DROPS OPHTHALMIC
Status: DISCONTINUED
Start: 2021-06-30 | End: 2021-06-30 | Stop reason: HOSPADM

## 2021-06-30 RX ORDER — BUPIVACAINE HYDROCHLORIDE 5 MG/ML
INJECTION, SOLUTION EPIDURAL; INTRACAUDAL
Status: DISCONTINUED
Start: 2021-06-30 | End: 2021-06-30 | Stop reason: HOSPADM

## 2021-06-30 RX ORDER — ACETAMINOPHEN 325 MG/1
650 TABLET ORAL EVERY 8 HOURS PRN
Status: DISCONTINUED | OUTPATIENT
Start: 2021-06-30 | End: 2021-06-30 | Stop reason: HOSPADM

## 2021-06-30 RX ORDER — NEOMYCIN SULFATE, POLYMYXIN B SULFATE AND DEXAMETHASONE 3.5; 10000; 1 MG/ML; [USP'U]/ML; MG/ML
1 SUSPENSION/ DROPS OPHTHALMIC EVERY 8 HOURS
Qty: 5 ML | Refills: 0 | Status: SHIPPED | OUTPATIENT
Start: 2021-06-30 | End: 2021-08-20

## 2021-06-30 RX ORDER — HYDROMORPHONE HYDROCHLORIDE 1 MG/ML
0.2 INJECTION, SOLUTION INTRAMUSCULAR; INTRAVENOUS; SUBCUTANEOUS EVERY 5 MIN PRN
Status: DISCONTINUED | OUTPATIENT
Start: 2021-06-30 | End: 2021-06-30 | Stop reason: HOSPADM

## 2021-06-30 RX ORDER — PHENYLEPHRINE HCL IN 0.9% NACL 1 MG/10 ML
SYRINGE (ML) INTRAVENOUS
Status: DISCONTINUED | OUTPATIENT
Start: 2021-06-30 | End: 2021-06-30

## 2021-06-30 RX ORDER — PROPOFOL 10 MG/ML
VIAL (ML) INTRAVENOUS
Status: DISCONTINUED | OUTPATIENT
Start: 2021-06-30 | End: 2021-06-30

## 2021-06-30 RX ORDER — LIDOCAINE HCL/EPINEPHRINE/PF 2%-1:200K
VIAL (ML) INJECTION
Status: DISCONTINUED
Start: 2021-06-30 | End: 2021-06-30 | Stop reason: HOSPADM

## 2021-06-30 RX ORDER — ONDANSETRON 2 MG/ML
INJECTION INTRAMUSCULAR; INTRAVENOUS
Status: DISCONTINUED | OUTPATIENT
Start: 2021-06-30 | End: 2021-06-30

## 2021-06-30 RX ORDER — SODIUM CHLORIDE 0.9 % (FLUSH) 0.9 %
10 SYRINGE (ML) INJECTION
Status: DISCONTINUED | OUTPATIENT
Start: 2021-06-30 | End: 2021-06-30 | Stop reason: HOSPADM

## 2021-06-30 RX ORDER — FENTANYL CITRATE 50 UG/ML
INJECTION, SOLUTION INTRAMUSCULAR; INTRAVENOUS
Status: DISCONTINUED | OUTPATIENT
Start: 2021-06-30 | End: 2021-06-30

## 2021-06-30 RX ORDER — DEXMEDETOMIDINE HYDROCHLORIDE 100 UG/ML
INJECTION, SOLUTION INTRAVENOUS
Status: DISCONTINUED | OUTPATIENT
Start: 2021-06-30 | End: 2021-06-30

## 2021-06-30 RX ORDER — TALC
6 POWDER (GRAM) TOPICAL NIGHTLY PRN
Status: DISCONTINUED | OUTPATIENT
Start: 2021-06-30 | End: 2021-06-30 | Stop reason: HOSPADM

## 2021-06-30 RX ORDER — HYDROCODONE BITARTRATE AND ACETAMINOPHEN 5; 325 MG/1; MG/1
1 TABLET ORAL EVERY 6 HOURS PRN
Qty: 16 TABLET | Refills: 0 | Status: SHIPPED | OUTPATIENT
Start: 2021-06-30 | End: 2021-08-20

## 2021-06-30 RX ORDER — DEXAMETHASONE SODIUM PHOSPHATE 4 MG/ML
INJECTION, SOLUTION INTRA-ARTICULAR; INTRALESIONAL; INTRAMUSCULAR; INTRAVENOUS; SOFT TISSUE
Status: DISCONTINUED | OUTPATIENT
Start: 2021-06-30 | End: 2021-06-30

## 2021-06-30 RX ORDER — ACETAMINOPHEN 10 MG/ML
INJECTION, SOLUTION INTRAVENOUS
Status: DISCONTINUED | OUTPATIENT
Start: 2021-06-30 | End: 2021-06-30

## 2021-06-30 RX ORDER — CEFAZOLIN SODIUM 1 G/3ML
INJECTION, POWDER, FOR SOLUTION INTRAMUSCULAR; INTRAVENOUS
Status: DISCONTINUED | OUTPATIENT
Start: 2021-06-30 | End: 2021-06-30

## 2021-06-30 RX ORDER — SODIUM CHLORIDE 0.9 % (FLUSH) 0.9 %
3 SYRINGE (ML) INJECTION
Status: DISCONTINUED | OUTPATIENT
Start: 2021-06-30 | End: 2021-06-30 | Stop reason: HOSPADM

## 2021-06-30 RX ORDER — MIDAZOLAM HYDROCHLORIDE 1 MG/ML
INJECTION, SOLUTION INTRAMUSCULAR; INTRAVENOUS
Status: DISCONTINUED | OUTPATIENT
Start: 2021-06-30 | End: 2021-06-30

## 2021-06-30 RX ADMIN — Medication 100 MCG: at 01:06

## 2021-06-30 RX ADMIN — DEXMEDETOMIDINE HYDROCHLORIDE 12 MCG: 100 INJECTION, SOLUTION, CONCENTRATE INTRAVENOUS at 02:06

## 2021-06-30 RX ADMIN — DEXAMETHASONE SODIUM PHOSPHATE 4 MG: 4 INJECTION INTRA-ARTICULAR; INTRALESIONAL; INTRAMUSCULAR; INTRAVENOUS; SOFT TISSUE at 12:06

## 2021-06-30 RX ADMIN — DEXMEDETOMIDINE HYDROCHLORIDE 8 MCG: 100 INJECTION, SOLUTION, CONCENTRATE INTRAVENOUS at 02:06

## 2021-06-30 RX ADMIN — SODIUM CHLORIDE: 0.9 INJECTION, SOLUTION INTRAVENOUS at 12:06

## 2021-06-30 RX ADMIN — MIDAZOLAM HYDROCHLORIDE 2 MG: 1 INJECTION, SOLUTION INTRAMUSCULAR; INTRAVENOUS at 12:06

## 2021-06-30 RX ADMIN — ACETAMINOPHEN 1000 MG: 10 INJECTION INTRAVENOUS at 01:06

## 2021-06-30 RX ADMIN — PROPOFOL 150 MG: 10 INJECTION, EMULSION INTRAVENOUS at 12:06

## 2021-06-30 RX ADMIN — ONDANSETRON 4 MG: 2 INJECTION INTRAMUSCULAR; INTRAVENOUS at 12:06

## 2021-06-30 RX ADMIN — FENTANYL CITRATE 50 MCG: 50 INJECTION INTRAMUSCULAR; INTRAVENOUS at 12:06

## 2021-06-30 RX ADMIN — GLYCOPYRROLATE 0.2 MG: 0.2 INJECTION, SOLUTION INTRAMUSCULAR; INTRAVITREAL at 01:06

## 2021-06-30 RX ADMIN — LIDOCAINE HYDROCHLORIDE 100 MG: 20 INJECTION, SOLUTION EPIDURAL; INFILTRATION; INTRACAUDAL at 12:06

## 2021-06-30 RX ADMIN — CEFAZOLIN 2 G: 330 INJECTION, POWDER, FOR SOLUTION INTRAMUSCULAR; INTRAVENOUS at 12:06

## 2021-07-01 ENCOUNTER — TELEPHONE (OUTPATIENT)
Dept: OPHTHALMOLOGY | Facility: CLINIC | Age: 61
End: 2021-07-01

## 2021-07-02 ENCOUNTER — TELEPHONE (OUTPATIENT)
Dept: OPHTHALMOLOGY | Facility: CLINIC | Age: 61
End: 2021-07-02

## 2021-07-02 PROCEDURE — 88332 PATH CONSLTJ SURG EA ADD BLK: CPT | Mod: 26,,, | Performed by: PATHOLOGY

## 2021-07-02 PROCEDURE — 88331 PR  PATH CONSULT IN SURG,W FRZ SEC: ICD-10-PCS | Mod: 26,,, | Performed by: PATHOLOGY

## 2021-07-02 PROCEDURE — 88332 PR  PATH CONSULT IN SURG,W ADDN FRZ SEC: ICD-10-PCS | Mod: 26,,, | Performed by: PATHOLOGY

## 2021-07-02 PROCEDURE — 88305 TISSUE EXAM BY PATHOLOGIST: CPT | Mod: 26,,, | Performed by: PATHOLOGY

## 2021-07-02 PROCEDURE — 88305 TISSUE EXAM BY PATHOLOGIST: ICD-10-PCS | Mod: 26,,, | Performed by: PATHOLOGY

## 2021-07-02 PROCEDURE — 88331 PATH CONSLTJ SURG 1 BLK 1SPC: CPT | Mod: 26,,, | Performed by: PATHOLOGY

## 2021-07-08 ENCOUNTER — OFFICE VISIT (OUTPATIENT)
Dept: OPHTHALMOLOGY | Facility: CLINIC | Age: 61
End: 2021-07-08
Payer: COMMERCIAL

## 2021-07-08 DIAGNOSIS — R73.03 PREDIABETES: ICD-10-CM

## 2021-07-08 DIAGNOSIS — Z98.890 POST-OPERATIVE STATE: Primary | ICD-10-CM

## 2021-07-08 DIAGNOSIS — C44.99 MUCINOUS CARCINOMA OF SKIN: ICD-10-CM

## 2021-07-08 PROCEDURE — 99024 PR POST-OP FOLLOW-UP VISIT: ICD-10-PCS | Mod: S$GLB,,, | Performed by: OPHTHALMOLOGY

## 2021-07-08 PROCEDURE — 99024 POSTOP FOLLOW-UP VISIT: CPT | Mod: S$GLB,,, | Performed by: OPHTHALMOLOGY

## 2021-07-08 PROCEDURE — 92285 EXTERNAL PHOTOGRAPHY - OU - BOTH EYES: ICD-10-PCS | Mod: S$GLB,,, | Performed by: OPHTHALMOLOGY

## 2021-07-08 PROCEDURE — 99999 PR PBB SHADOW E&M-EST. PATIENT-LVL II: CPT | Mod: PBBFAC,,, | Performed by: OPHTHALMOLOGY

## 2021-07-08 PROCEDURE — 92285 EXTERNAL OCULAR PHOTOGRAPHY: CPT | Mod: S$GLB,,, | Performed by: OPHTHALMOLOGY

## 2021-07-08 PROCEDURE — 99999 PR PBB SHADOW E&M-EST. PATIENT-LVL II: ICD-10-PCS | Mod: PBBFAC,,, | Performed by: OPHTHALMOLOGY

## 2021-07-14 LAB
FINAL PATHOLOGIC DIAGNOSIS: NORMAL
FROZEN SECTION DIAGNOSIS: NORMAL
FROZEN SECTION FOOTNOTE: NORMAL
GROSS: NORMAL
Lab: NORMAL
MICROSCOPIC EXAM: NORMAL

## 2021-07-19 ENCOUNTER — HOSPITAL ENCOUNTER (OUTPATIENT)
Dept: RADIOLOGY | Facility: CLINIC | Age: 61
Discharge: HOME | End: 2021-07-19
Attending: INTERNAL MEDICINE
Payer: COMMERCIAL

## 2021-07-19 DIAGNOSIS — Z13.820 ENCOUNTER FOR OSTEOPOROSIS SCREENING IN ASYMPTOMATIC POSTMENOPAUSAL PATIENT: ICD-10-CM

## 2021-07-19 DIAGNOSIS — Z78.0 ENCOUNTER FOR OSTEOPOROSIS SCREENING IN ASYMPTOMATIC POSTMENOPAUSAL PATIENT: ICD-10-CM

## 2021-07-19 PROCEDURE — 77080 DXA BONE DENSITY AXIAL: CPT | Mod: TC,PO

## 2021-07-19 PROCEDURE — 77080 DEXA BONE DENSITY SPINE HIP: ICD-10-PCS | Mod: 26,,, | Performed by: INTERNAL MEDICINE

## 2021-07-19 PROCEDURE — 77080 DXA BONE DENSITY AXIAL: CPT | Mod: 26,,, | Performed by: INTERNAL MEDICINE

## 2021-08-19 ENCOUNTER — OFFICE VISIT (OUTPATIENT)
Dept: OPHTHALMOLOGY | Facility: CLINIC | Age: 61
End: 2021-08-19
Payer: COMMERCIAL

## 2021-08-19 ENCOUNTER — TELEPHONE (OUTPATIENT)
Dept: OPHTHALMOLOGY | Facility: CLINIC | Age: 61
End: 2021-08-19

## 2021-08-19 DIAGNOSIS — R73.03 PREDIABETES: ICD-10-CM

## 2021-08-19 DIAGNOSIS — C44.99 MUCINOUS CARCINOMA OF SKIN: ICD-10-CM

## 2021-08-19 DIAGNOSIS — C44.99 MUCINOUS CARCINOMA OF SKIN: Primary | ICD-10-CM

## 2021-08-19 DIAGNOSIS — Z98.890 POST-OPERATIVE STATE: Primary | ICD-10-CM

## 2021-08-19 PROCEDURE — 99999 PR PBB SHADOW E&M-EST. PATIENT-LVL II: CPT | Mod: PBBFAC,,, | Performed by: OPHTHALMOLOGY

## 2021-08-19 PROCEDURE — 99024 POSTOP FOLLOW-UP VISIT: CPT | Mod: S$GLB,,, | Performed by: OPHTHALMOLOGY

## 2021-08-19 PROCEDURE — 92285 EXTERNAL OCULAR PHOTOGRAPHY: CPT | Mod: S$GLB,,, | Performed by: OPHTHALMOLOGY

## 2021-08-19 PROCEDURE — 92285 EXTERNAL PHOTOGRAPHY - OU - BOTH EYES: ICD-10-PCS | Mod: S$GLB,,, | Performed by: OPHTHALMOLOGY

## 2021-08-19 PROCEDURE — 99024 PR POST-OP FOLLOW-UP VISIT: ICD-10-PCS | Mod: S$GLB,,, | Performed by: OPHTHALMOLOGY

## 2021-08-19 PROCEDURE — 99999 PR PBB SHADOW E&M-EST. PATIENT-LVL II: ICD-10-PCS | Mod: PBBFAC,,, | Performed by: OPHTHALMOLOGY

## 2021-08-20 ENCOUNTER — OFFICE VISIT (OUTPATIENT)
Dept: INTERNAL MEDICINE | Facility: CLINIC | Age: 61
End: 2021-08-20
Payer: COMMERCIAL

## 2021-08-20 VITALS
SYSTOLIC BLOOD PRESSURE: 119 MMHG | TEMPERATURE: 98 F | WEIGHT: 199.5 LBS | BODY MASS INDEX: 34.06 KG/M2 | DIASTOLIC BLOOD PRESSURE: 84 MMHG | HEART RATE: 60 BPM | HEIGHT: 64 IN

## 2021-08-20 DIAGNOSIS — E78.00 HYPERCHOLESTEROLEMIA: ICD-10-CM

## 2021-08-20 DIAGNOSIS — E03.9 HYPOTHYROIDISM, UNSPECIFIED TYPE: Primary | ICD-10-CM

## 2021-08-20 DIAGNOSIS — E66.09 CLASS 1 OBESITY DUE TO EXCESS CALORIES WITH SERIOUS COMORBIDITY AND BODY MASS INDEX (BMI) OF 34.0 TO 34.9 IN ADULT: ICD-10-CM

## 2021-08-20 DIAGNOSIS — Z13.220 LIPID SCREENING: ICD-10-CM

## 2021-08-20 DIAGNOSIS — Z98.84 BARIATRIC SURGERY STATUS: ICD-10-CM

## 2021-08-20 DIAGNOSIS — R73.03 PREDIABETES: ICD-10-CM

## 2021-08-20 PROBLEM — E66.811 CLASS 1 OBESITY DUE TO EXCESS CALORIES WITH SERIOUS COMORBIDITY AND BODY MASS INDEX (BMI) OF 34.0 TO 34.9 IN ADULT: Status: ACTIVE | Noted: 2021-04-13

## 2021-08-20 PROCEDURE — 99214 PR OFFICE/OUTPT VISIT, EST, LEVL IV, 30-39 MIN: ICD-10-PCS | Mod: S$GLB,,, | Performed by: INTERNAL MEDICINE

## 2021-08-20 PROCEDURE — 99214 OFFICE O/P EST MOD 30 MIN: CPT | Mod: S$GLB,,, | Performed by: INTERNAL MEDICINE

## 2021-08-20 RX ORDER — LEVOTHYROXINE SODIUM 75 UG/1
75 TABLET ORAL
Qty: 90 TABLET | Refills: 3 | Status: SHIPPED | OUTPATIENT
Start: 2021-08-20

## 2021-08-20 RX ORDER — EZETIMIBE 10 MG/1
10 TABLET ORAL NIGHTLY
Qty: 90 TABLET | Refills: 3 | Status: SHIPPED | OUTPATIENT
Start: 2021-08-20

## 2021-08-20 RX ORDER — CHOLECALCIFEROL (VITAMIN D3) 50 MCG
2000 TABLET ORAL DAILY
Start: 2021-08-20

## 2021-08-20 RX ORDER — ASPIRIN 81 MG/1
81 TABLET ORAL DAILY
Refills: 0
Start: 2021-08-20 | End: 2023-06-12

## 2021-08-23 ENCOUNTER — TELEPHONE (OUTPATIENT)
Dept: HEMATOLOGY/ONCOLOGY | Facility: CLINIC | Age: 61
End: 2021-08-23

## 2021-08-23 PROBLEM — Z00.01 ENCOUNTER FOR GENERAL ADULT MEDICAL EXAMINATION WITH ABNORMAL FINDINGS: Status: RESOLVED | Noted: 2021-05-24 | Resolved: 2021-08-23

## 2022-01-06 ENCOUNTER — OFFICE VISIT (OUTPATIENT)
Dept: OPHTHALMOLOGY | Facility: CLINIC | Age: 62
End: 2022-01-06
Payer: COMMERCIAL

## 2022-01-06 DIAGNOSIS — C44.99 MUCINOUS CARCINOMA OF SKIN: Primary | ICD-10-CM

## 2022-01-06 PROCEDURE — 99213 PR OFFICE/OUTPT VISIT, EST, LEVL III, 20-29 MIN: ICD-10-PCS | Mod: S$GLB,,, | Performed by: OPHTHALMOLOGY

## 2022-01-06 PROCEDURE — 99999 PR PBB SHADOW E&M-EST. PATIENT-LVL II: CPT | Mod: PBBFAC,,, | Performed by: OPHTHALMOLOGY

## 2022-01-06 PROCEDURE — 99213 OFFICE O/P EST LOW 20 MIN: CPT | Mod: S$GLB,,, | Performed by: OPHTHALMOLOGY

## 2022-01-06 PROCEDURE — 92285 EXTERNAL PHOTOGRAPHY - OU - BOTH EYES: ICD-10-PCS | Mod: S$GLB,,, | Performed by: OPHTHALMOLOGY

## 2022-01-06 PROCEDURE — 99999 PR PBB SHADOW E&M-EST. PATIENT-LVL II: ICD-10-PCS | Mod: PBBFAC,,, | Performed by: OPHTHALMOLOGY

## 2022-01-06 PROCEDURE — 92285 EXTERNAL OCULAR PHOTOGRAPHY: CPT | Mod: S$GLB,,, | Performed by: OPHTHALMOLOGY

## 2022-01-06 NOTE — PROGRESS NOTES
HPI     Patient here for 6mo f/u   No c/o about eyes x last seen  Was advised to see hem/onc for potential GI nidus for mucinous carcinoma   by Dr. Boo at last visit, but she did not make that appointment.  Patient's mother had hx of colon carcinoma and mantle cell lymphoma.     Eye meds: None    Last edited by Mera Justin on 1/6/2022  8:50 AM. (History)            Assessment /Plan     For exam results, see Encounter Report.    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  She underwent full-thickness resection reconstruction and canthoplasty 6 months prior.  Her only complaints at this time or occasional tearing bilaterally.    On exam, the patient has a well-healed left upper eyelid with good closure.  She does not have any preauricular submandibular adenopathy.    These findings were discussed with the patient.    Return in 6 months for repeat examination.  There are no signs of recurrence of the tumor at this time    Return to Dr. Gayle for routine eye care

## 2022-07-01 ENCOUNTER — OFFICE VISIT (OUTPATIENT)
Dept: INTERNAL MEDICINE | Facility: CLINIC | Age: 62
End: 2022-07-01
Payer: COMMERCIAL

## 2022-07-01 VITALS
SYSTOLIC BLOOD PRESSURE: 116 MMHG | BODY MASS INDEX: 36.71 KG/M2 | WEIGHT: 199.5 LBS | HEART RATE: 70 BPM | TEMPERATURE: 99 F | DIASTOLIC BLOOD PRESSURE: 73 MMHG | HEIGHT: 62 IN

## 2022-07-01 DIAGNOSIS — J20.9 ACUTE BRONCHITIS WITH BRONCHOSPASM: ICD-10-CM

## 2022-07-01 DIAGNOSIS — J06.9 ACUTE URI: Primary | ICD-10-CM

## 2022-07-01 PROCEDURE — 99212 OFFICE O/P EST SF 10 MIN: CPT | Mod: S$GLB,,, | Performed by: INTERNAL MEDICINE

## 2022-07-01 PROCEDURE — 99212 PR OFFICE/OUTPT VISIT, EST, LEVL II, 10-19 MIN: ICD-10-PCS | Mod: S$GLB,,, | Performed by: INTERNAL MEDICINE

## 2022-07-01 RX ORDER — PROMETHAZINE HYDROCHLORIDE AND DEXTROMETHORPHAN HYDROBROMIDE 6.25; 15 MG/5ML; MG/5ML
5 SYRUP ORAL EVERY 6 HOURS PRN
Qty: 180 ML | Refills: 0 | Status: SHIPPED | OUTPATIENT
Start: 2022-07-01 | End: 2023-01-30 | Stop reason: ALTCHOICE

## 2022-07-01 RX ORDER — DEXAMETHASONE 0.75 MG/1
1.5 TABLET ORAL EVERY 12 HOURS
Qty: 10 TABLET | Refills: 0 | Status: SHIPPED | OUTPATIENT
Start: 2022-07-01 | End: 2022-07-04

## 2022-07-01 RX ORDER — PRAVASTATIN SODIUM 10 MG/1
10 TABLET ORAL DAILY
COMMUNITY

## 2022-07-01 NOTE — PROGRESS NOTES
The patient location is: home  The chief complaint leading to consultation is: SOB, cough and congestio    Visit type: audio only    Face to Face time with patient: 10  10 minutes of total time spent on the encounter, which includes face to face time and non-face to face time preparing to see the patient (eg, review of tests), Obtaining and/or reviewing separately obtained history, Documenting clinical information in the electronic or other health record, Independently interpreting results (not separately reported) and communicating results to the patient/family/caregiver, or Care coordination (not separately reported).         Each patient to whom he or she provides medical services by telemedicine is:  (1) informed of the relationship between the physician and patient and the respective role of any other health care provider with respect to management of the patient; and (2) notified that he or she may decline to receive medical services by telemedicine and may withdraw from such care at any time.    Notes:     Chief C/o:    Cough (Pt. c/o a dry cough with sore throat, fever, sinus pressure and congestion in head x 2 days.), Hyperlipidemia, Hypothyroidism, and Pre-diabetes        Health Care Maintenance    Health Maintenance       Date Due Completion Date    Pneumococcal Vaccines (Age 0-64) (1 - PCV) Never done ---    Shingles Vaccine (1 of 2) Never done ---    COVID-19 Vaccine (4 - Booster for Pfizer series) 02/05/2022 10/5/2021    Influenza Vaccine (1) 09/01/2022 ---    Mammogram 05/26/2023 5/26/2022    DEXA Scan 07/19/2023 7/19/2021    Lipid Panel 10/30/2024 10/30/2019    Colorectal Cancer Screening 02/04/2026 6/8/2021    TETANUS VACCINE 06/03/2032 6/3/2022                 HISTORY OF PRESENT ILLNESS:    MARISOL Jane is a 61 y.o. female who presents to the clinic today for Cough (Pt. c/o a dry cough with sore throat, fever, sinus pressure and congestion in head x 2 days.), Hyperlipidemia,  Hypothyroidism, and Pre-diabetes  .  She has no fever no chills, she is feeling short of breath however there is no wheezing and no chest pain.  Patient is having change in her voice as                  ALLERGIES AND MEDICATIONS: updated and reviewed.  Review of patient's allergies indicates:   Allergen Reactions    Codeine      Edema      Lipitor [atorvastatin] Hives    Sulfa (sulfonamide antibiotics)      Medication List with Changes/Refills   New Medications    DEXAMETHASONE (DECADRON) 0.75 MG TAB    Take 2 tablets (1.5 mg total) by mouth every 12 (twelve) hours. for 5 doses    PROMETHAZINE-DEXTROMETHORPHAN (PROMETHAZINE-DM) 6.25-15 MG/5 ML SYRP    Take 5 mLs by mouth every 6 (six) hours as needed.   Current Medications    ASPIRIN (ECOTRIN) 81 MG EC TABLET    Take 1 tablet (81 mg total) by mouth once daily.    CHOLECALCIFEROL, VITAMIN D3, (VITAMIN D3) 50 MCG (2,000 UNIT) TAB    Take 1 tablet (2,000 Units total) by mouth once daily.    EZETIMIBE (ZETIA) 10 MG TABLET    Take 1 tablet (10 mg total) by mouth nightly.    PRAVASTATIN (PRAVACHOL) 10 MG TABLET    Take 10 mg by mouth once daily.    SYNTHROID 75 MCG TABLET    Take 1 tablet (75 mcg total) by mouth before breakfast.       Problem List:  Patient Active Problem List   Diagnosis    Prediabetes    Hypothyroidism, unspecified    Bariatric surgery status    Chronic fatigue and malaise    Cystocele with rectocele    Class 1 obesity due to excess calories with serious comorbidity and body mass index (BMI) of 34.0 to 34.9 in adult    Mucinous carcinoma of skin, left upper eyelid    Hypercholesterolemia         CARE TEAM:    Patient Care Team:  Reid Fuentes MD as PCP - General (Internal Medicine)         REVIEW OF SYSTEMS:    Review of Systems   Constitutional: Negative for appetite change, chills, diaphoresis, fatigue, fever and unexpected weight change.   HENT: Positive for congestion, sore throat and voice change. Negative for ear discharge,  "ear pain, facial swelling, mouth sores, nosebleeds, rhinorrhea, sinus pain, sneezing, tinnitus and trouble swallowing.    Eyes: Negative for pain, discharge, redness, itching and visual disturbance.   Respiratory: Positive for cough and shortness of breath. Negative for choking, chest tightness, wheezing and stridor.    Cardiovascular: Negative for chest pain, palpitations and leg swelling.   Gastrointestinal: Negative for abdominal distention, abdominal pain, anal bleeding, blood in stool, constipation, diarrhea, nausea and vomiting.   Endocrine: Negative for cold intolerance, heat intolerance, polydipsia, polyphagia and polyuria.   Genitourinary: Negative for decreased urine volume, difficulty urinating, dysuria, flank pain, frequency, hematuria and urgency.   Musculoskeletal: Positive for arthralgias and back pain. Negative for gait problem, joint swelling, myalgias, neck pain and neck stiffness.   Skin: Negative for color change, rash and wound.   Allergic/Immunologic: Negative for environmental allergies, food allergies and immunocompromised state.   Neurological: Negative for dizziness, tremors, seizures, syncope, speech difficulty, light-headedness, numbness and headaches.   Hematological: Negative for adenopathy. Does not bruise/bleed easily.   Psychiatric/Behavioral: Negative for agitation, behavioral problems, confusion, decreased concentration, dysphoric mood, hallucinations, sleep disturbance and suicidal ideas. The patient is not nervous/anxious.          PHYSICAL EXAM:    Vitals:    07/01/22 1055   BP: 116/73   Pulse: 70   Temp: 99 °F (37.2 °C)     Weight: 90.5 kg (199 lb 8.3 oz)   Height: 5' 1.77" (156.9 cm)   Body mass index is 36.76 kg/m².  Vitals:    07/01/22 1055   BP: 116/73   Pulse: 70   Temp: 99 °F (37.2 °C)   TempSrc: Temporal   Weight: 90.5 kg (199 lb 8.3 oz)   Height: 5' 1.77" (1.569 m)   PainSc: 0-No pain          Physical Exam  Constitutional:       Comments: This visit was a TELEMEDICINE " ENCOUNTER.  Physical examination was not possible to be performed.              Labs:    No results found for: GLU, NA, K, CL, CO2, BUN, CREATININE, CALCIUM, PROT, ALBUMIN, BILITOT, ALKPHOS, AST, ALT, ANIONGAP, ESTGFRAFRICA, EGFRNONAA  No results found for: WBC, RBC, HGB, HCT, MCV, RDW, PLT   No results found for: CHOL, TRIG, HDL, LDLCALC, TOTALCHOLEST  No results found for: TSH  No results found for: HGBA1C, ESTIMATEDAVG   No components found for: MICROALBUMIN/CREATININE    ASSESSMENT & PLAN:    1. Acute URI  - promethazine-dextromethorphan (PROMETHAZINE-DM) 6.25-15 mg/5 mL Syrp; Take 5 mLs by mouth every 6 (six) hours as needed.  Dispense: 180 mL; Refill: 0  - dexAMETHasone (DECADRON) 0.75 MG Tab; Take 2 tablets (1.5 mg total) by mouth every 12 (twelve) hours. for 5 doses  Dispense: 10 tablet; Refill: 0    2. Acute bronchitis with bronchospasm  - promethazine-dextromethorphan (PROMETHAZINE-DM) 6.25-15 mg/5 mL Syrp; Take 5 mLs by mouth every 6 (six) hours as needed.  Dispense: 180 mL; Refill: 0  - dexAMETHasone (DECADRON) 0.75 MG Tab; Take 2 tablets (1.5 mg total) by mouth every 12 (twelve) hours. for 5 doses  Dispense: 10 tablet; Refill: 0       Patient was advised to keep well hydrated, to the emergency room for any distress or worsening of symptoms.      No orders of the defined types were placed in this encounter.     No follow-ups on file. or sooner as needed.    Patient was counseled and questions and concerns were addressed.    Please note:  Parts of this report were done using a dictation software, voice to text, and sometimes the text contains some uncorrected words or sentences that are missed during revision.

## 2023-01-30 ENCOUNTER — OFFICE VISIT (OUTPATIENT)
Dept: INTERNAL MEDICINE | Facility: CLINIC | Age: 63
End: 2023-01-30
Payer: COMMERCIAL

## 2023-01-30 VITALS
DIASTOLIC BLOOD PRESSURE: 76 MMHG | BODY MASS INDEX: 38.15 KG/M2 | HEART RATE: 64 BPM | HEIGHT: 62 IN | TEMPERATURE: 98 F | WEIGHT: 207.31 LBS | SYSTOLIC BLOOD PRESSURE: 116 MMHG

## 2023-01-30 DIAGNOSIS — R53.81 CHRONIC FATIGUE AND MALAISE: ICD-10-CM

## 2023-01-30 DIAGNOSIS — E66.01 CLASS 2 SEVERE OBESITY DUE TO EXCESS CALORIES WITH SERIOUS COMORBIDITY AND BODY MASS INDEX (BMI) OF 38.0 TO 38.9 IN ADULT: ICD-10-CM

## 2023-01-30 DIAGNOSIS — Z98.84 BARIATRIC SURGERY STATUS: ICD-10-CM

## 2023-01-30 DIAGNOSIS — F43.23 ADJUSTMENT REACTION WITH ANXIETY AND DEPRESSION: ICD-10-CM

## 2023-01-30 DIAGNOSIS — E78.00 PURE HYPERCHOLESTEROLEMIA: ICD-10-CM

## 2023-01-30 DIAGNOSIS — R73.03 PREDIABETES: ICD-10-CM

## 2023-01-30 DIAGNOSIS — R53.82 CHRONIC FATIGUE AND MALAISE: ICD-10-CM

## 2023-01-30 DIAGNOSIS — E03.9 ACQUIRED HYPOTHYROIDISM: ICD-10-CM

## 2023-01-30 DIAGNOSIS — Z00.00 ROUTINE GENERAL MEDICAL EXAMINATION AT A HEALTH CARE FACILITY: Primary | ICD-10-CM

## 2023-01-30 PROCEDURE — 99396 PR PREVENTIVE VISIT,EST,40-64: ICD-10-PCS | Mod: S$GLB,,, | Performed by: INTERNAL MEDICINE

## 2023-01-30 PROCEDURE — 99396 PREV VISIT EST AGE 40-64: CPT | Mod: S$GLB,,, | Performed by: INTERNAL MEDICINE

## 2023-01-30 RX ORDER — SEMAGLUTIDE 1.34 MG/ML
1 INJECTION, SOLUTION SUBCUTANEOUS
Qty: 1 PEN | Refills: 11
Start: 2023-01-30 | End: 2023-01-30 | Stop reason: SDUPTHER

## 2023-01-30 RX ORDER — BUSPIRONE HYDROCHLORIDE 10 MG/1
10 TABLET ORAL 2 TIMES DAILY
Qty: 60 TABLET | Refills: 2 | Status: SHIPPED | OUTPATIENT
Start: 2023-01-30 | End: 2024-01-30

## 2023-01-30 RX ORDER — SEMAGLUTIDE 1.34 MG/ML
1 INJECTION, SOLUTION SUBCUTANEOUS
Qty: 1 PEN | Refills: 11
Start: 2023-01-30 | End: 2023-06-12

## 2023-01-30 NOTE — PROGRESS NOTES
Chief C/o:    Urinary Frequency (Found out over the weekend that her 5 year relationship that man she was with has been cheating on her. He also tested positive for Hepatitis B and theshe was not made aware of this intill this weekend)        Health Care Maintenance    Health Maintenance         Date Due Completion Date    COVID-19 Vaccine (4 - Booster for Pfizer series) 11/30/2021 10/5/2021    Mammogram 05/26/2023 5/26/2022    DEXA Scan 07/19/2023 7/19/2021    Hemoglobin A1c (Prediabetes) 11/23/2023 11/23/2022    Colorectal Cancer Screening 02/04/2026 6/8/2021    Lipid Panel 11/23/2027 11/23/2022    TETANUS VACCINE 06/03/2032 6/3/2022                   HISTORY OF PRESENT ILLNESS:    MARISOL Jane is a 62 y.o. female who presents to the clinic today for Urinary Frequency (Found out over the weekend that her 5 year relationship that man she was with has been cheating on her. He also tested positive for Hepatitis B and theshe was not made aware of this intill this weekend)  .  Patient feels that she is stressed out, feels lot of anxiety.  Patient is having no chest pain, no shortness of breath, no fever no chills.  Patient is having no side effects related to current medications.Patient is having no chest pain, no shortness of breath, no fever no chills.  Patient is having no side effects related to current medications.                  ALLERGIES AND MEDICATIONS: updated and reviewed.  Review of patient's allergies indicates:   Allergen Reactions    Codeine      Edema      Lipitor [atorvastatin] Hives    Sulfa (sulfonamide antibiotics)      Medication List with Changes/Refills   New Medications    BUSPIRONE (BUSPAR) 10 MG TABLET    Take 1 tablet (10 mg total) by mouth 2 (two) times daily.    SEMAGLUTIDE (OZEMPIC) 1 MG/DOSE (4 MG/3 ML)    Inject 1 mg into the skin every 7 days.   Current Medications    ASPIRIN (ECOTRIN) 81 MG EC TABLET    Take 1 tablet (81 mg total) by mouth once daily.    CHOLECALCIFEROL,  VITAMIN D3, (VITAMIN D3) 50 MCG (2,000 UNIT) TAB    Take 1 tablet (2,000 Units total) by mouth once daily.    EZETIMIBE (ZETIA) 10 MG TABLET    Take 1 tablet (10 mg total) by mouth nightly.    PRAVASTATIN (PRAVACHOL) 10 MG TABLET    Take 10 mg by mouth once daily.    SYNTHROID 75 MCG TABLET    Take 1 tablet (75 mcg total) by mouth before breakfast.   Discontinued Medications    PROMETHAZINE-DEXTROMETHORPHAN (PROMETHAZINE-DM) 6.25-15 MG/5 ML SYRP    Take 5 mLs by mouth every 6 (six) hours as needed.       Problem List:  Patient Active Problem List   Diagnosis    Prediabetes    Acquired hypothyroidism    Bariatric surgery status    Chronic fatigue and malaise    Cystocele with rectocele    Class 2 severe obesity due to excess calories with serious comorbidity and body mass index (BMI) of 38.0 to 38.9 in adult    Mucinous carcinoma of skin, left upper eyelid    Pure hypercholesterolemia    Adjustment reaction with anxiety and depression         CARE TEAM:    Patient Care Team:  Reid Fuentes MD as PCP - General (Internal Medicine)         REVIEW OF SYSTEMS:    Review of Systems   Constitutional:  Negative for appetite change, chills, diaphoresis, fatigue, fever and unexpected weight change.   HENT:  Negative for congestion, ear discharge, ear pain, facial swelling, mouth sores, nosebleeds, rhinorrhea, sinus pain, sneezing, sore throat, tinnitus, trouble swallowing and voice change.    Eyes:  Negative for pain, discharge, redness, itching and visual disturbance.   Respiratory:  Negative for cough, choking, chest tightness, shortness of breath, wheezing and stridor.    Cardiovascular:  Negative for chest pain, palpitations and leg swelling.   Gastrointestinal:  Negative for abdominal distention, abdominal pain, anal bleeding, blood in stool, constipation, diarrhea, nausea and vomiting.   Endocrine: Negative for cold intolerance, heat intolerance, polydipsia, polyphagia and polyuria.   Genitourinary:  Negative  "for decreased urine volume, difficulty urinating, dysuria, flank pain, frequency, hematuria and urgency.   Musculoskeletal:  Positive for arthralgias and back pain. Negative for gait problem, joint swelling, myalgias, neck pain and neck stiffness.   Skin:  Negative for color change, rash and wound.   Allergic/Immunologic: Negative for environmental allergies, food allergies and immunocompromised state.   Neurological:  Negative for dizziness, tremors, seizures, syncope, speech difficulty, light-headedness, numbness and headaches.   Hematological:  Negative for adenopathy. Does not bruise/bleed easily.   Psychiatric/Behavioral:  Positive for dysphoric mood. Negative for agitation, behavioral problems, confusion, decreased concentration, hallucinations, sleep disturbance and suicidal ideas. The patient is nervous/anxious.        PHYSICAL EXAM:    Vitals:    01/30/23 0835   BP: 116/76   Pulse: 64   Temp: 97.7 °F (36.5 °C)     Weight: 94 kg (207 lb 5.5 oz)   Height: 5' 1.77" (156.9 cm)   Body mass index is 38.2 kg/m².  Vitals:    01/30/23 0835   BP: 116/76   Pulse: 64   Temp: 97.7 °F (36.5 °C)   TempSrc: Temporal   Weight: 94 kg (207 lb 5.5 oz)   Height: 5' 1.77" (1.569 m)   PainSc: 0-No pain          Physical Exam  Vitals and nursing note reviewed.   Constitutional:       General: She is not in acute distress.     Appearance: She is obese. She is not ill-appearing, toxic-appearing or diaphoretic.      Comments: Patient is alert, awake and oriented X 3.  Patient is comfortable, cooperative and in no apparent distress.     HENT:      Head: Normocephalic and atraumatic.      Right Ear: Tympanic membrane, ear canal and external ear normal. There is no impacted cerumen.      Left Ear: Tympanic membrane, ear canal and external ear normal. There is no impacted cerumen.      Nose: Nose normal. No congestion or rhinorrhea.      Mouth/Throat:      Mouth: Mucous membranes are moist.      Pharynx: Oropharynx is clear. No " oropharyngeal exudate or posterior oropharyngeal erythema.   Eyes:      General: No scleral icterus.        Right eye: No discharge.         Left eye: No discharge.      Extraocular Movements: Extraocular movements intact.      Conjunctiva/sclera: Conjunctivae normal.      Pupils: Pupils are equal, round, and reactive to light.   Cardiovascular:      Rate and Rhythm: Normal rate and regular rhythm.      Pulses: Normal pulses.      Heart sounds: Normal heart sounds. No murmur heard.    No friction rub. No gallop.   Pulmonary:      Effort: Pulmonary effort is normal. No respiratory distress.      Breath sounds: Normal breath sounds. No stridor. No wheezing, rhonchi or rales.   Chest:      Chest wall: No tenderness.   Abdominal:      General: Bowel sounds are normal. There is no distension.      Palpations: Abdomen is soft. There is no mass.      Tenderness: There is no abdominal tenderness. There is no guarding or rebound.      Hernia: No hernia is present.   Musculoskeletal:         General: No swelling, tenderness, deformity or signs of injury. Normal range of motion.      Cervical back: Normal range of motion and neck supple. No rigidity.      Right lower leg: No edema.      Left lower leg: No edema.      Comments: Bilateral knee crepitations.   Lymphadenopathy:      Cervical: No cervical adenopathy.   Skin:     General: Skin is warm and dry.      Capillary Refill: Capillary refill takes less than 2 seconds.      Coloration: Skin is not jaundiced or pale.      Findings: No bruising, erythema, lesion or rash.   Neurological:      General: No focal deficit present.      Mental Status: She is alert and oriented to person, place, and time.      Cranial Nerves: No cranial nerve deficit.      Sensory: No sensory deficit.      Motor: No weakness.      Coordination: Coordination normal.      Deep Tendon Reflexes: Reflexes normal.   Psychiatric:         Behavior: Behavior normal.         Thought Content: Thought content  normal.         Judgment: Judgment normal.      Comments: anxious    __________________________________________________________  Questionnaires to be scanned to the media section of patient's EHR records:    PHQ-9.  FLACO-7.    Safety at home.  -------------------------------  Past medical history, Family history, Social history and Allergies were reviewed.      Labs:    No results found for: GLU, NA, K, CL, CO2, BUN, CREATININE, CALCIUM, PROT, ALBUMIN, BILITOT, ALKPHOS, AST, ALT, ANIONGAP, ESTGFRAFRICA, EGFRNONAA  No results found for: WBC, RBC, HGB, HCT, MCV, RDW, PLT   No results found for: CHOL, TRIG, HDL, LDLCALC, TOTALCHOLEST  No results found for: TSH  Lab Results   Component Value Date    HGBA1C 5.4 11/23/2022    ESTIMATEDAVG 108 11/23/2022      No components found for: MICROALBUMIN/CREATININE    ASSESSMENT & PLAN:    1. Routine general medical examination at a health care facility    2. Pure hypercholesterolemia  The current medical regimen is effective;  continue present plan and medications.  3. Prediabetes  - semaglutide (OZEMPIC) 1 mg/dose (4 mg/3 mL); Inject 1 mg into the skin every 7 days.  Dispense: 1 pen; Refill: 11  Blood sugar is in the range of prediabetes.  In simple words, blood sugar is elevated more than the upper limit of normal, and less than that of Diabetes Mellitus.   Healthy diet, exercise and weight management are essential, to prevent or decrease chances of progression to f clinical Diabetes Mellitus.  Patient's gynecologist started the patient on Ozempic and she probably is taking 0.5 mg till recently, however she is out of the medicine as it is not available in the pharmacy.  4. Acquired hypothyroidism  The current medical regimen is effective;  continue present plan and medications.  5. Adjustment reaction with anxiety and depression  - busPIRone (BUSPAR) 10 MG tablet; Take 1 tablet (10 mg total) by mouth 2 (two) times daily.  Dispense: 60 tablet; Refill: 2  - Ambulatory  referral/consult to Social Work; Future  Emotional support and encouragement were provided and patient was referred to see  for counseling  6. Class 2 severe obesity due to excess calories with serious comorbidity and body mass index (BMI) of 38.0 to 38.9 in adult  - semaglutide (OZEMPIC) 1 mg/dose (4 mg/3 mL); Inject 1 mg into the skin every 7 days.  Dispense: 1 pen; Refill: 11  Healthy diet, exercise, and weight monitoring are essential for weight management.    Weight loss was discussed.  Ultimate goal is BMI below 25.   7. Bariatric surgery status  8. Chronic fatigue and malaise     Orders for STD testing were given paper based, will see the patient in 2 weeks discuss results.        Orders Placed This Encounter   Procedures    Ambulatory referral/consult to Social Work      Follow up in about 2 weeks (around 2/13/2023), or if symptoms worsen or fail to improve. or sooner as needed.    Patient was counseled and questions and concerns were addressed.    Please note:  Parts of this report were done using a dictation software, voice to text, and sometimes the text contains some uncorrected words or sentences that are missed during revision.

## 2023-02-01 LAB
C TRACH RRNA SPEC QL NAA+PROBE: NOT DETECTED
COMMENT: NORMAL
HBV SURFACE AG SERPL QL IA: NORMAL
HCV AB S/CO SERPL IA: 0.09
HCV AB SERPL QL IA: NORMAL
HIV 1+2 AB+HIV1 P24 AG SERPL QL IA: NORMAL
HSV1 IGM SER QL IF: NEGATIVE
HSV2 IGM SER QL IF: NEGATIVE
N GONORRHOEA RRNA SPEC QL NAA+PROBE: NOT DETECTED
RPR SER QL: NORMAL

## 2023-02-13 ENCOUNTER — OFFICE VISIT (OUTPATIENT)
Dept: INTERNAL MEDICINE | Facility: CLINIC | Age: 63
End: 2023-02-13
Payer: COMMERCIAL

## 2023-02-13 VITALS
BODY MASS INDEX: 38.28 KG/M2 | HEIGHT: 62 IN | SYSTOLIC BLOOD PRESSURE: 121 MMHG | WEIGHT: 208 LBS | TEMPERATURE: 98 F | HEART RATE: 71 BPM | DIASTOLIC BLOOD PRESSURE: 71 MMHG

## 2023-02-13 DIAGNOSIS — R73.03 PREDIABETES: ICD-10-CM

## 2023-02-13 DIAGNOSIS — E66.01 CLASS 2 SEVERE OBESITY DUE TO EXCESS CALORIES WITH SERIOUS COMORBIDITY AND BODY MASS INDEX (BMI) OF 38.0 TO 38.9 IN ADULT: ICD-10-CM

## 2023-02-13 DIAGNOSIS — F43.23 ADJUSTMENT REACTION WITH ANXIETY AND DEPRESSION: ICD-10-CM

## 2023-02-13 DIAGNOSIS — E78.00 PURE HYPERCHOLESTEROLEMIA: Primary | ICD-10-CM

## 2023-02-13 DIAGNOSIS — R53.82 CHRONIC FATIGUE AND MALAISE: ICD-10-CM

## 2023-02-13 DIAGNOSIS — Z98.84 BARIATRIC SURGERY STATUS: ICD-10-CM

## 2023-02-13 DIAGNOSIS — R53.81 CHRONIC FATIGUE AND MALAISE: ICD-10-CM

## 2023-02-13 PROCEDURE — 99212 OFFICE O/P EST SF 10 MIN: CPT | Mod: S$GLB,,, | Performed by: INTERNAL MEDICINE

## 2023-02-13 PROCEDURE — 99212 PR OFFICE/OUTPT VISIT, EST, LEVL II, 10-19 MIN: ICD-10-PCS | Mod: S$GLB,,, | Performed by: INTERNAL MEDICINE

## 2023-02-13 NOTE — PROGRESS NOTES
Chief C/o:    Hyperlipidemia (Lab results check.), Hypothyroidism, and Pre-diabetes        Health Care Maintenance    Health Maintenance         Date Due Completion Date    COVID-19 Vaccine (4 - Booster for Pfizer series) 11/30/2021 10/5/2021    Mammogram 05/26/2023 5/26/2022    DEXA Scan 07/19/2023 7/19/2021    Hemoglobin A1c (Prediabetes) 11/23/2023 11/23/2022    Colorectal Cancer Screening 02/04/2026 6/8/2021    Lipid Panel 11/23/2027 11/23/2022    TETANUS VACCINE 08/09/2032 8/9/2022                   HISTORY OF PRESENT ILLNESS:    MARISOL Jane is a 62 y.o. female who presents to the clinic today for Hyperlipidemia (Lab results check.), Hypothyroidism, and Pre-diabetes  . Patient is having no chest pain, no shortness of breath, no fever no chills.  Patient is having no side effects related to current medications.                  ALLERGIES AND MEDICATIONS: updated and reviewed.  Review of patient's allergies indicates:   Allergen Reactions    Codeine      Edema      Lipitor [atorvastatin] Hives    Sulfa (sulfonamide antibiotics)      Medication List with Changes/Refills   Current Medications    ASPIRIN (ECOTRIN) 81 MG EC TABLET    Take 1 tablet (81 mg total) by mouth once daily.    BUSPIRONE (BUSPAR) 10 MG TABLET    Take 1 tablet (10 mg total) by mouth 2 (two) times daily.    CHOLECALCIFEROL, VITAMIN D3, (VITAMIN D3) 50 MCG (2,000 UNIT) TAB    Take 1 tablet (2,000 Units total) by mouth once daily.    EZETIMIBE (ZETIA) 10 MG TABLET    Take 1 tablet (10 mg total) by mouth nightly.    PRAVASTATIN (PRAVACHOL) 10 MG TABLET    Take 10 mg by mouth once daily.    SEMAGLUTIDE (OZEMPIC) 1 MG/DOSE (4 MG/3 ML)    Inject 1 mg into the skin every 7 days.    SYNTHROID 75 MCG TABLET    Take 1 tablet (75 mcg total) by mouth before breakfast.       Problem List:  Patient Active Problem List   Diagnosis    Prediabetes    Acquired hypothyroidism    Bariatric surgery status    Chronic fatigue and malaise    Cystocele with  rectocele    Class 2 severe obesity due to excess calories with serious comorbidity and body mass index (BMI) of 38.0 to 38.9 in adult    Mucinous carcinoma of skin, left upper eyelid    Pure hypercholesterolemia    Adjustment reaction with anxiety and depression         CARE TEAM:    Patient Care Team:  Reid Fuentes MD as PCP - General (Internal Medicine)         REVIEW OF SYSTEMS:    Review of Systems   Constitutional:  Negative for appetite change, chills, diaphoresis, fatigue, fever and unexpected weight change.   HENT:  Negative for congestion, ear discharge, ear pain, facial swelling, mouth sores, nosebleeds, rhinorrhea, sinus pain, sneezing, sore throat, tinnitus, trouble swallowing and voice change.    Eyes:  Negative for pain, discharge, redness, itching and visual disturbance.   Respiratory:  Negative for cough, choking, chest tightness, shortness of breath, wheezing and stridor.    Cardiovascular:  Negative for chest pain, palpitations and leg swelling.   Gastrointestinal:  Negative for abdominal distention, abdominal pain, anal bleeding, blood in stool, constipation, diarrhea, nausea and vomiting.   Endocrine: Negative for cold intolerance, heat intolerance, polydipsia, polyphagia and polyuria.   Genitourinary:  Negative for decreased urine volume, difficulty urinating, dysuria, flank pain, frequency, hematuria and urgency.   Musculoskeletal:  Positive for arthralgias and back pain. Negative for gait problem, joint swelling, myalgias, neck pain and neck stiffness.   Skin:  Negative for color change, rash and wound.   Allergic/Immunologic: Negative for environmental allergies, food allergies and immunocompromised state.   Neurological:  Negative for dizziness, tremors, seizures, syncope, speech difficulty, light-headedness, numbness and headaches.   Hematological:  Negative for adenopathy. Does not bruise/bleed easily.   Psychiatric/Behavioral:  Negative for agitation, behavioral problems,  "confusion, decreased concentration, dysphoric mood, hallucinations, sleep disturbance and suicidal ideas. The patient is nervous/anxious.        PHYSICAL EXAM:    Vitals:    02/13/23 1526   BP: 121/71   Pulse: 71   Temp: 97.7 °F (36.5 °C)     Weight: 94.3 kg (208 lb 0.1 oz)   Height: 5' 1.77" (156.9 cm)   Body mass index is 38.33 kg/m².  Vitals:    02/13/23 1526   BP: 121/71   Pulse: 71   Temp: 97.7 °F (36.5 °C)   TempSrc: Temporal   Weight: 94.3 kg (208 lb 0.1 oz)   Height: 5' 1.77" (1.569 m)   PainSc: 0-No pain          Physical Exam  Vitals and nursing note reviewed.   Constitutional:       General: She is not in acute distress.     Appearance: She is obese. She is not ill-appearing, toxic-appearing or diaphoretic.      Comments: Patient is alert, awake and oriented X 3.  Patient is comfortable, cooperative and in no apparent distress.     HENT:      Head: Normocephalic and atraumatic.      Right Ear: Tympanic membrane, ear canal and external ear normal. There is no impacted cerumen.      Left Ear: Tympanic membrane, ear canal and external ear normal. There is no impacted cerumen.      Nose: Nose normal. No congestion or rhinorrhea.      Mouth/Throat:      Mouth: Mucous membranes are moist.      Pharynx: Oropharynx is clear. No oropharyngeal exudate or posterior oropharyngeal erythema.   Eyes:      General: No scleral icterus.        Right eye: No discharge.         Left eye: No discharge.      Extraocular Movements: Extraocular movements intact.      Conjunctiva/sclera: Conjunctivae normal.      Pupils: Pupils are equal, round, and reactive to light.   Cardiovascular:      Rate and Rhythm: Normal rate and regular rhythm.      Pulses: Normal pulses.      Heart sounds: Normal heart sounds. No murmur heard.    No friction rub. No gallop.   Pulmonary:      Effort: Pulmonary effort is normal. No respiratory distress.      Breath sounds: Normal breath sounds. No stridor. No wheezing, rhonchi or rales.   Chest:      " Chest wall: No tenderness.   Abdominal:      General: Bowel sounds are normal. There is no distension.      Palpations: Abdomen is soft. There is no mass.      Tenderness: There is no abdominal tenderness. There is no guarding or rebound.      Hernia: No hernia is present.   Musculoskeletal:         General: No swelling, tenderness, deformity or signs of injury. Normal range of motion.      Cervical back: Normal range of motion and neck supple. No rigidity.      Right lower leg: No edema.      Left lower leg: No edema.      Comments: Bilateral knee crepitations.   Lymphadenopathy:      Cervical: No cervical adenopathy.   Skin:     General: Skin is warm and dry.      Capillary Refill: Capillary refill takes less than 2 seconds.      Coloration: Skin is not jaundiced or pale.      Findings: No bruising, erythema, lesion or rash.   Neurological:      General: No focal deficit present.      Mental Status: She is alert and oriented to person, place, and time.      Cranial Nerves: No cranial nerve deficit.      Sensory: No sensory deficit.      Motor: No weakness.      Coordination: Coordination normal.      Deep Tendon Reflexes: Reflexes normal.   Psychiatric:         Behavior: Behavior normal.         Thought Content: Thought content normal.         Judgment: Judgment normal.      Comments: anxious          Labs:  Discussed with patient.    No results found for: GLU, NA, K, CL, CO2, BUN, CREATININE, CALCIUM, PROT, ALBUMIN, BILITOT, ALKPHOS, AST, ALT, ANIONGAP, ESTGFRAFRICA, EGFRNONAA  No results found for: WBC, RBC, HGB, HCT, MCV, RDW, PLT   No results found for: CHOL, TRIG, HDL, LDLCALC, TOTALCHOLEST  No results found for: TSH  Lab Results   Component Value Date    HGBA1C 5.4 11/23/2022    ESTIMATEDAVG 108 11/23/2022      No components found for: MICROALBUMIN/CREATININE    ASSESSMENT & PLAN:    1. Pure hypercholesterolemia    2. Prediabetes    3. Adjustment reaction with anxiety and depression    4. Class 2 severe  obesity due to excess calories with serious comorbidity and body mass index (BMI) of 38.0 to 38.9 in adult    5. Chronic fatigue and malaise    6. Bariatric surgery status     Patient is stable, will continue current medications, her anxiety impulse improving with BuSpar.  Her labs were unremarkable.    Healthy diet and exercise and weight management were advised, otherwise continue same medications, follow-up in about 1 month.      No orders of the defined types were placed in this encounter.     No follow-ups on file. or sooner as needed.    Patient was counseled and questions and concerns were addressed.    Please note:  Parts of this report were done using a dictation software, voice to text, and sometimes the text contains some uncorrected words or sentences that are missed during revision.

## 2023-03-13 ENCOUNTER — OFFICE VISIT (OUTPATIENT)
Dept: INTERNAL MEDICINE | Facility: CLINIC | Age: 63
End: 2023-03-13
Payer: COMMERCIAL

## 2023-03-13 VITALS
HEIGHT: 62 IN | DIASTOLIC BLOOD PRESSURE: 72 MMHG | HEART RATE: 54 BPM | BODY MASS INDEX: 37.75 KG/M2 | TEMPERATURE: 98 F | SYSTOLIC BLOOD PRESSURE: 119 MMHG | WEIGHT: 205.13 LBS

## 2023-03-13 DIAGNOSIS — R73.03 PREDIABETES: ICD-10-CM

## 2023-03-13 DIAGNOSIS — E66.01 CLASS 2 SEVERE OBESITY DUE TO EXCESS CALORIES WITH SERIOUS COMORBIDITY AND BODY MASS INDEX (BMI) OF 37.0 TO 37.9 IN ADULT: ICD-10-CM

## 2023-03-13 DIAGNOSIS — R76.8 HEPATITIS B CORE ANTIBODY POSITIVE: ICD-10-CM

## 2023-03-13 DIAGNOSIS — E78.00 PURE HYPERCHOLESTEROLEMIA: Primary | ICD-10-CM

## 2023-03-13 PROCEDURE — 99213 OFFICE O/P EST LOW 20 MIN: CPT | Mod: S$GLB,,, | Performed by: INTERNAL MEDICINE

## 2023-03-13 PROCEDURE — 99213 PR OFFICE/OUTPT VISIT, EST, LEVL III, 20-29 MIN: ICD-10-PCS | Mod: S$GLB,,, | Performed by: INTERNAL MEDICINE

## 2023-03-13 NOTE — PROGRESS NOTES
Chief C/o:    Hyperlipidemia (Lab results check.), Hypothyroidism, and Pre-diabetes        Health Care Maintenance    Health Maintenance         Date Due Completion Date    COVID-19 Vaccine (4 - Booster for Pfizer series) 11/30/2021 10/5/2021    Mammogram 05/26/2023 5/26/2022    DEXA Scan 07/19/2023 7/19/2021    Hemoglobin A1c (Prediabetes) 11/23/2023 11/23/2022    Colorectal Cancer Screening 02/04/2026 6/8/2021    Lipid Panel 11/23/2027 11/23/2022    TETANUS VACCINE 08/09/2032 8/9/2022                   HISTORY OF PRESENT ILLNESS:    MARISOL Jane is a 62 y.o. female who presents to the clinic today for Hyperlipidemia (Lab results check.), Hypothyroidism, and Pre-diabetes  .   Patient is having no chest pain, no shortness of breath, no fever no chills.  Patient is having no side effects related to current medications.                ALLERGIES AND MEDICATIONS: updated and reviewed.  Review of patient's allergies indicates:   Allergen Reactions    Codeine      Edema      Lipitor [atorvastatin] Hives    Sulfa (sulfonamide antibiotics)      Medication List with Changes/Refills   Current Medications    ASPIRIN (ECOTRIN) 81 MG EC TABLET    Take 1 tablet (81 mg total) by mouth once daily.    BUSPIRONE (BUSPAR) 10 MG TABLET    Take 1 tablet (10 mg total) by mouth 2 (two) times daily.    CHOLECALCIFEROL, VITAMIN D3, (VITAMIN D3) 50 MCG (2,000 UNIT) TAB    Take 1 tablet (2,000 Units total) by mouth once daily.    EZETIMIBE (ZETIA) 10 MG TABLET    Take 1 tablet (10 mg total) by mouth nightly.    PRAVASTATIN (PRAVACHOL) 10 MG TABLET    Take 10 mg by mouth once daily.    SEMAGLUTIDE (OZEMPIC) 1 MG/DOSE (4 MG/3 ML)    Inject 1 mg into the skin every 7 days.    SYNTHROID 75 MCG TABLET    Take 1 tablet (75 mcg total) by mouth before breakfast.       Problem List:  Patient Active Problem List   Diagnosis    Prediabetes    Acquired hypothyroidism    Bariatric surgery status    Chronic fatigue and malaise    Cystocele with  rectocele    Class 2 severe obesity due to excess calories with serious comorbidity and body mass index (BMI) of 37.0 to 37.9 in adult    Mucinous carcinoma of skin, left upper eyelid    Pure hypercholesterolemia    Adjustment reaction with anxiety and depression    Hepatitis B core antibody positive, IGM         CARE TEAM:    Patient Care Team:  Reid Fuentes MD as PCP - General (Internal Medicine)         REVIEW OF SYSTEMS:    Review of Systems   Constitutional:  Negative for appetite change, chills, diaphoresis, fatigue, fever and unexpected weight change.   HENT:  Negative for congestion, ear discharge, ear pain, facial swelling, mouth sores, nosebleeds, rhinorrhea, sinus pain, sneezing, sore throat, tinnitus, trouble swallowing and voice change.    Eyes:  Negative for pain, discharge, redness, itching and visual disturbance.   Respiratory:  Negative for cough, choking, chest tightness, shortness of breath, wheezing and stridor.    Cardiovascular:  Negative for chest pain, palpitations and leg swelling.   Gastrointestinal:  Negative for abdominal distention, abdominal pain, anal bleeding, blood in stool, constipation, diarrhea, nausea and vomiting.   Endocrine: Negative for cold intolerance, heat intolerance, polydipsia, polyphagia and polyuria.   Genitourinary:  Negative for decreased urine volume, difficulty urinating, dysuria, flank pain, frequency, hematuria and urgency.   Musculoskeletal:  Positive for arthralgias and back pain. Negative for gait problem, joint swelling, myalgias, neck pain and neck stiffness.   Skin:  Negative for color change, rash and wound.   Allergic/Immunologic: Negative for environmental allergies, food allergies and immunocompromised state.   Neurological:  Negative for dizziness, tremors, seizures, syncope, speech difficulty, light-headedness, numbness and headaches.   Hematological:  Negative for adenopathy. Does not bruise/bleed easily.   Psychiatric/Behavioral:   "Negative for agitation, behavioral problems, confusion, decreased concentration, dysphoric mood, hallucinations, sleep disturbance and suicidal ideas. The patient is nervous/anxious.        PHYSICAL EXAM:    Vitals:    03/13/23 1448   BP: 119/72   Pulse: (!) 54   Temp: 97.7 °F (36.5 °C)     Weight: 93.1 kg (205 lb 2.2 oz)   Height: 5' 1.77" (156.9 cm)   Body mass index is 37.8 kg/m².  Vitals:    03/13/23 1448   BP: 119/72   Pulse: (!) 54   Temp: 97.7 °F (36.5 °C)   TempSrc: Temporal   Weight: 93.1 kg (205 lb 2.2 oz)   Height: 5' 1.77" (1.569 m)   PainSc: 0-No pain          Physical Exam  Vitals and nursing note reviewed.   Constitutional:       General: She is not in acute distress.     Appearance: She is obese. She is not ill-appearing, toxic-appearing or diaphoretic.      Comments: Patient is alert, awake and oriented X 3.  Patient is comfortable, cooperative and in no apparent distress.     HENT:      Head: Normocephalic and atraumatic.      Right Ear: Tympanic membrane, ear canal and external ear normal. There is no impacted cerumen.      Left Ear: Tympanic membrane, ear canal and external ear normal. There is no impacted cerumen.      Nose: Nose normal. No congestion or rhinorrhea.      Mouth/Throat:      Mouth: Mucous membranes are moist.      Pharynx: Oropharynx is clear. No oropharyngeal exudate or posterior oropharyngeal erythema.   Eyes:      General: No scleral icterus.        Right eye: No discharge.         Left eye: No discharge.      Extraocular Movements: Extraocular movements intact.      Conjunctiva/sclera: Conjunctivae normal.      Pupils: Pupils are equal, round, and reactive to light.   Cardiovascular:      Rate and Rhythm: Regular rhythm. Bradycardia present.      Pulses: Normal pulses.      Heart sounds: Normal heart sounds. No murmur heard.    No friction rub. No gallop.   Pulmonary:      Effort: Pulmonary effort is normal. No respiratory distress.      Breath sounds: Normal breath sounds. No " stridor. No wheezing, rhonchi or rales.   Chest:      Chest wall: No tenderness.   Abdominal:      General: Bowel sounds are normal. There is no distension.      Palpations: Abdomen is soft. There is no mass.      Tenderness: There is no abdominal tenderness. There is no guarding or rebound.      Hernia: No hernia is present.   Musculoskeletal:         General: No swelling, tenderness, deformity or signs of injury. Normal range of motion.      Cervical back: Normal range of motion and neck supple. No rigidity.      Right lower leg: No edema.      Left lower leg: No edema.      Comments: Bilateral knee crepitations.   Lymphadenopathy:      Cervical: No cervical adenopathy.   Skin:     General: Skin is warm and dry.      Capillary Refill: Capillary refill takes less than 2 seconds.      Coloration: Skin is not jaundiced or pale.      Findings: No bruising, erythema, lesion or rash.   Neurological:      General: No focal deficit present.      Mental Status: She is alert and oriented to person, place, and time.      Cranial Nerves: No cranial nerve deficit.      Sensory: No sensory deficit.      Motor: No weakness.      Coordination: Coordination normal.      Deep Tendon Reflexes: Reflexes normal.   Psychiatric:         Behavior: Behavior normal.         Thought Content: Thought content normal.         Judgment: Judgment normal.      Comments: anxious          Labs:    No results found for: GLU, NA, K, CL, CO2, BUN, CREATININE, CALCIUM, PROT, ALBUMIN, BILITOT, ALKPHOS, AST, ALT, ANIONGAP, ESTGFRAFRICA, EGFRNONAA  No results found for: WBC, RBC, HGB, HCT, MCV, RDW, PLT   No results found for: CHOL, TRIG, HDL, LDLCALC, TOTALCHOLEST  No results found for: TSH  Lab Results   Component Value Date    HGBA1C 5.4 11/23/2022    ESTIMATEDAVG 108 11/23/2022      No components found for: MICROALBUMIN/CREATININE    ASSESSMENT & PLAN:    1. Pure hypercholesterolemia    2. Prediabetes    3. Class 2 severe obesity due to excess  calories with serious comorbidity and body mass index (BMI) of 37.0 to 37.9 in adult    4. Hepatitis B core antibody positive, IGM       Patient workup came back positive for hepatitis-B core antibody/IgM antibodies.  This might indicate recent infection with hepatitis B however could be a false-positive among other possibilities.    Repeat test again in about 3 months time patient was given the option to be referred to GI however she want to wait at this time, she is asymptomatic and have no significant complain, she was seen by her gynecologist and she is having no significant problems.    Healthy diet and exercise and weight loss were encouraged and will see her in 3 months with repeat labs.      No orders of the defined types were placed in this encounter.     No follow-ups on file. or sooner as needed.    Patient was counseled and questions and concerns were addressed.    Please note:  Parts of this report were done using a dictation software, voice to text, and sometimes the text contains some uncorrected words or sentences that are missed during revision.

## 2023-06-09 LAB
HBV CORE AB SERPL QL IA: POSITIVE
HBV CORE IGM SERPL QL IA: POSITIVE
HBV SURFACE AB SER QL: REACTIVE
HBV SURFACE AG SERPL QL IA: NEGATIVE
HIV 1+2 AB+HIV1 P24 AG SERPL QL IA: NON REACTIVE
RPR SER QL: NON REACTIVE

## 2023-06-12 ENCOUNTER — OFFICE VISIT (OUTPATIENT)
Dept: INTERNAL MEDICINE | Facility: CLINIC | Age: 63
End: 2023-06-12
Payer: COMMERCIAL

## 2023-06-12 VITALS
SYSTOLIC BLOOD PRESSURE: 115 MMHG | TEMPERATURE: 98 F | DIASTOLIC BLOOD PRESSURE: 72 MMHG | HEIGHT: 62 IN | HEART RATE: 70 BPM | WEIGHT: 203.13 LBS | BODY MASS INDEX: 37.38 KG/M2

## 2023-06-12 DIAGNOSIS — R53.82 CHRONIC FATIGUE AND MALAISE: ICD-10-CM

## 2023-06-12 DIAGNOSIS — R73.03 PREDIABETES: ICD-10-CM

## 2023-06-12 DIAGNOSIS — F43.23 ADJUSTMENT REACTION WITH ANXIETY AND DEPRESSION: ICD-10-CM

## 2023-06-12 DIAGNOSIS — E66.01 CLASS 2 SEVERE OBESITY DUE TO EXCESS CALORIES WITH SERIOUS COMORBIDITY AND BODY MASS INDEX (BMI) OF 37.0 TO 37.9 IN ADULT: ICD-10-CM

## 2023-06-12 DIAGNOSIS — R76.8 HEPATITIS B CORE ANTIBODY POSITIVE: ICD-10-CM

## 2023-06-12 DIAGNOSIS — Z98.84 BARIATRIC SURGERY STATUS: ICD-10-CM

## 2023-06-12 DIAGNOSIS — R53.81 CHRONIC FATIGUE AND MALAISE: ICD-10-CM

## 2023-06-12 DIAGNOSIS — E78.00 PURE HYPERCHOLESTEROLEMIA: Primary | ICD-10-CM

## 2023-06-12 DIAGNOSIS — E03.9 ACQUIRED HYPOTHYROIDISM: ICD-10-CM

## 2023-06-12 PROCEDURE — 99214 OFFICE O/P EST MOD 30 MIN: CPT | Mod: S$GLB,,, | Performed by: INTERNAL MEDICINE

## 2023-06-12 PROCEDURE — 99214 PR OFFICE/OUTPT VISIT, EST, LEVL IV, 30-39 MIN: ICD-10-PCS | Mod: S$GLB,,, | Performed by: INTERNAL MEDICINE

## 2023-06-12 RX ORDER — ORAL SEMAGLUTIDE 14 MG/1
TABLET ORAL
COMMUNITY
Start: 2023-06-10

## 2023-06-12 NOTE — PROGRESS NOTES
Chief C/o:    Follow-up (Lab results), Pre-diabetes, Hyperlipidemia, and Hypothyroidism        Health Care Maintenance    Health Maintenance         Date Due Completion Date    COVID-19 Vaccine (4 - Pfizer series) 11/30/2021 10/5/2021    DEXA Scan 07/19/2023 7/19/2021    Hemoglobin A1c (Prediabetes) 03/31/2024 3/31/2023    Mammogram 05/31/2024 5/31/2023    Colorectal Cancer Screening 02/04/2026 6/8/2021    Lipid Panel 03/31/2028 3/31/2023    TETANUS VACCINE 08/09/2032 8/9/2022                   HISTORY OF PRESENT ILLNESS:    MARISOL Jane is a 62 y.o. female who presents to the clinic today for Follow-up (Lab results), Pre-diabetes, Hyperlipidemia, and Hypothyroidism  .   Patient is having no chest pain, no shortness of breath, no fever no chills.  Patient is having no side effects related to current medications.                ALLERGIES AND MEDICATIONS: updated and reviewed.  Review of patient's allergies indicates:   Allergen Reactions    Codeine      Edema      Lipitor [atorvastatin] Hives    Sulfa (sulfonamide antibiotics)      Medication List with Changes/Refills   Current Medications    ASPIRIN (ECOTRIN) 81 MG EC TABLET    Take 1 tablet (81 mg total) by mouth once daily.    BUSPIRONE (BUSPAR) 10 MG TABLET    Take 1 tablet (10 mg total) by mouth 2 (two) times daily.    CHOLECALCIFEROL, VITAMIN D3, (VITAMIN D3) 50 MCG (2,000 UNIT) TAB    Take 1 tablet (2,000 Units total) by mouth once daily.    EZETIMIBE (ZETIA) 10 MG TABLET    Take 1 tablet (10 mg total) by mouth nightly.    PRAVASTATIN (PRAVACHOL) 10 MG TABLET    Take 10 mg by mouth once daily.    SEMAGLUTIDE (RYBELSUS) 14 MG TABLET        SYNTHROID 75 MCG TABLET    Take 1 tablet (75 mcg total) by mouth before breakfast.   Discontinued Medications    SEMAGLUTIDE (OZEMPIC) 1 MG/DOSE (4 MG/3 ML)    Inject 1 mg into the skin every 7 days.       Problem List:  Patient Active Problem List   Diagnosis    Prediabetes    Acquired hypothyroidism    Bariatric  surgery status    Chronic fatigue and malaise    Cystocele with rectocele    Class 2 severe obesity due to excess calories with serious comorbidity and body mass index (BMI) of 37.0 to 37.9 in adult    Mucinous carcinoma of skin, left upper eyelid    Pure hypercholesterolemia    Adjustment reaction with anxiety and depression    Hepatitis B core antibody positive, IGM         CARE TEAM:    Patient Care Team:  Reid Fuentes MD as PCP - General (Internal Medicine)         REVIEW OF SYSTEMS:    Review of Systems   Constitutional:  Negative for appetite change, chills, diaphoresis, fatigue, fever and unexpected weight change.   HENT:  Negative for congestion, ear discharge, ear pain, facial swelling, mouth sores, nosebleeds, rhinorrhea, sinus pain, sneezing, sore throat, tinnitus, trouble swallowing and voice change.    Eyes:  Negative for pain, discharge, redness, itching and visual disturbance.   Respiratory:  Negative for cough, choking, chest tightness, shortness of breath, wheezing and stridor.    Cardiovascular:  Negative for chest pain, palpitations and leg swelling.   Gastrointestinal:  Negative for abdominal distention, abdominal pain, anal bleeding, blood in stool, constipation, diarrhea, nausea and vomiting.   Endocrine: Negative for cold intolerance, heat intolerance, polydipsia, polyphagia and polyuria.   Genitourinary:  Negative for decreased urine volume, difficulty urinating, dysuria, flank pain, frequency, hematuria and urgency.   Musculoskeletal:  Positive for arthralgias and back pain. Negative for gait problem, joint swelling, myalgias, neck pain and neck stiffness.   Skin:  Negative for color change, rash and wound.   Allergic/Immunologic: Negative for environmental allergies, food allergies and immunocompromised state.   Neurological:  Negative for dizziness, tremors, seizures, syncope, speech difficulty, light-headedness, numbness and headaches.   Hematological:  Negative for adenopathy.  "Does not bruise/bleed easily.   Psychiatric/Behavioral:  Negative for agitation, behavioral problems, confusion, decreased concentration, dysphoric mood, hallucinations, sleep disturbance and suicidal ideas. The patient is nervous/anxious.        PHYSICAL EXAM:    Vitals:    06/12/23 0833   BP: 115/72   Pulse: 70   Temp: 97.9 °F (36.6 °C)     Weight: 92.1 kg (203 lb 2.5 oz)   Height: 5' 1.77" (156.9 cm)   Body mass index is 37.43 kg/m².  Vitals:    06/12/23 0833   BP: 115/72   Pulse: 70   Temp: 97.9 °F (36.6 °C)   TempSrc: Temporal   Weight: 92.1 kg (203 lb 2.5 oz)   Height: 5' 1.77" (1.569 m)          Physical Exam  Vitals and nursing note reviewed.   Constitutional:       General: She is not in acute distress.     Appearance: She is obese. She is not ill-appearing, toxic-appearing or diaphoretic.      Comments: Patient is alert, awake and oriented X 3.  Patient is comfortable, cooperative and in no apparent distress.     HENT:      Head: Normocephalic and atraumatic.      Right Ear: Tympanic membrane, ear canal and external ear normal. There is no impacted cerumen.      Left Ear: Tympanic membrane, ear canal and external ear normal. There is no impacted cerumen.      Nose: Nose normal. No congestion or rhinorrhea.      Mouth/Throat:      Mouth: Mucous membranes are moist.      Pharynx: Oropharynx is clear. No oropharyngeal exudate or posterior oropharyngeal erythema.   Eyes:      General: No scleral icterus.        Right eye: No discharge.         Left eye: No discharge.      Extraocular Movements: Extraocular movements intact.      Conjunctiva/sclera: Conjunctivae normal.      Pupils: Pupils are equal, round, and reactive to light.   Cardiovascular:      Rate and Rhythm: Normal rate and regular rhythm.      Pulses: Normal pulses.      Heart sounds: Normal heart sounds. No murmur heard.    No friction rub. No gallop.   Pulmonary:      Effort: Pulmonary effort is normal. No respiratory distress.      Breath sounds: " Normal breath sounds. No stridor. No wheezing, rhonchi or rales.   Chest:      Chest wall: No tenderness.   Abdominal:      General: Bowel sounds are normal. There is no distension.      Palpations: Abdomen is soft. There is no mass.      Tenderness: There is no abdominal tenderness. There is no guarding or rebound.      Hernia: No hernia is present.   Musculoskeletal:         General: No swelling, tenderness, deformity or signs of injury. Normal range of motion.      Cervical back: Normal range of motion and neck supple. No rigidity.      Right lower leg: No edema.      Left lower leg: No edema.      Comments: Bilateral knee crepitations.   Lymphadenopathy:      Cervical: No cervical adenopathy.   Skin:     General: Skin is warm and dry.      Capillary Refill: Capillary refill takes less than 2 seconds.      Coloration: Skin is not jaundiced or pale.      Findings: No bruising, erythema, lesion or rash.   Neurological:      General: No focal deficit present.      Mental Status: She is alert and oriented to person, place, and time.      Cranial Nerves: No cranial nerve deficit.      Sensory: No sensory deficit.      Motor: No weakness.      Coordination: Coordination normal.      Deep Tendon Reflexes: Reflexes normal.   Psychiatric:         Behavior: Behavior normal.         Thought Content: Thought content normal.         Judgment: Judgment normal.      Comments: anxious          Labs:    No results found for: GLU, NA, K, CL, CO2, BUN, CREATININE, CALCIUM, PROT, ALBUMIN, BILITOT, ALKPHOS, AST, ALT, ANIONGAP, ESTGFRAFRICA, EGFRNONAA  No results found for: WBC, RBC, HGB, HCT, MCV, RDW, PLT   No results found for: CHOL, TRIG, HDL, LDLCALC, TOTALCHOLEST  No results found for: TSH  Lab Results   Component Value Date    HGBA1C 5.4 03/31/2023    ESTIMATEDAVG 108 03/31/2023      No components found for: MICROALBUMIN/CREATININE    ASSESSMENT & PLAN:    1. Pure hypercholesterolemia    2. Prediabetes    3. Acquired  hypothyroidism    4. Adjustment reaction with anxiety and depression    5. Class 2 severe obesity due to excess calories with serious comorbidity and body mass index (BMI) of 37.0 to 37.9 in adult    6. Hepatitis B core antibody positive, IGM    7. Chronic fatigue and malaise    8. Bariatric surgery status       Patient is doing well, the test for hepatitis panel and HIV were positive for hepatitis-B antibodies which indicates immunity, with no hepatitis-C or another STD positive results.  Of note that hepatitis B core antigen was positive as well.  Patient probably had history of hepatitis-B which she overcome.  To continue current medication, diet exercise and weight loss were encouraged and follow-up in 3 months and when needed.      No orders of the defined types were placed in this encounter.     Follow up in about 3 months (around 9/12/2023), or if symptoms worsen or fail to improve. or sooner as needed.    Patient was counseled and questions and concerns were addressed.    Please note:  Parts of this report were done using a dictation software, voice to text, and sometimes the text contains some uncorrected words or sentences that are missed during revision.

## 2025-02-04 DIAGNOSIS — M25.562 PAIN IN BOTH KNEES, UNSPECIFIED CHRONICITY: Primary | ICD-10-CM

## 2025-02-04 DIAGNOSIS — M25.561 PAIN IN BOTH KNEES, UNSPECIFIED CHRONICITY: Primary | ICD-10-CM

## 2025-02-11 ENCOUNTER — ANESTHESIA EVENT (OUTPATIENT)
Dept: SURGERY | Facility: HOSPITAL | Age: 65
End: 2025-02-11
Payer: MEDICARE

## 2025-02-11 ENCOUNTER — OFFICE VISIT (OUTPATIENT)
Dept: ORTHOPEDICS | Facility: CLINIC | Age: 65
End: 2025-02-11
Attending: ORTHOPAEDIC SURGERY
Payer: COMMERCIAL

## 2025-02-11 VITALS — BODY MASS INDEX: 37.81 KG/M2 | WEIGHT: 205.5 LBS | HEIGHT: 62 IN

## 2025-02-11 DIAGNOSIS — M17.11 PRIMARY OSTEOARTHRITIS OF RIGHT KNEE: Primary | ICD-10-CM

## 2025-02-11 PROCEDURE — 99999 PR PBB SHADOW E&M-EST. PATIENT-LVL IV: CPT | Mod: PBBFAC,,, | Performed by: ORTHOPAEDIC SURGERY

## 2025-02-11 PROCEDURE — 99204 OFFICE O/P NEW MOD 45 MIN: CPT | Mod: S$GLB,,, | Performed by: ORTHOPAEDIC SURGERY

## 2025-02-11 NOTE — PROGRESS NOTES
NEW PATIENT ORTHOPAEDIC: Knee    PRIMARY CARE PHYSICIAN: Reid Fuentes MD   REFERRING PROVIDER: Chucky Salcedo MD  [address] [facility]  [place]  [place] [address]     ASSESSMENT & PLAN:    Impression:  Right Knee Severe Degenerative Osteoarthritis, Primary     Follow Up Plan: 3 weeks after surgery     Surgery:    Sally Jane has radiograph and physical exam evidence of the aforementioned impression and wishes to pursue surgery. This patient appears to have sufficient symptoms to warrant surgical intervention and is an appropriate candidate for right Primary Total Knee Arthroplasty as evidenced by months of unsuccessful non-operative treatment as outlined in the HPI below and progressive symptoms.    We had a lengthy discussion regarding the risk and benefit of surgery, the alternatives, limitations and personnel involved. These included but were not limited to infection, persistent pain, instability, nerve injury, blood clots, loosening and medical complications. We also discussed the pre-operative course, surgery itself and rehabilitation.    Mae-operative blood management and transfusion issues were discussed, and options clearly outlined. The patient has consented to the use of the banked allogenic blood if medically necessary.    The patient has elected to schedule surgery at this time or intends to call the office with a surgical date. Shared decision making occurred while obtaining informed consent. The patient will be scheduled for a pre-operative education class at which time they will have their nasal swab completed and will be given CHG cloths along with the verbal and written instructions for their use.    We will plan for use of Ancram cemented CR components, with ROSY assistance.     Tentative Surgery Date: 3/12/25. Same Day    Patient has a long history of having right knee pain.  She has seen Dr. Barbosa for her orthopedic care prior to today.  She had changes of insurance in his looking to  have surgical consultation with regard to this right knee.  She has undergone steroid as well as viscosupplementation injections into her knee.  Her last Visco series was about 2 months ago.  He has fail to provide long-lasting relief.  She has radiographs today that demonstrate severe end-stage arthritis of her lateral compartment with the associated valgus deformity.  Because she has failed multiple nonoperative treatment modalities I think the surgeries a reasonable option for her.    The patient has been ordered:  CT (ROSY Protocol)    CONSULTS:   Anesthesia for pre-surgical optimization    ACTIVE PROBLEM LIST  Patient Active Problem List   Diagnosis    Prediabetes    Acquired hypothyroidism    Bariatric surgery status    Chronic fatigue and malaise    Cystocele with rectocele    Class 2 severe obesity due to excess calories with serious comorbidity and body mass index (BMI) of 37.0 to 37.9 in adult    Mucinous carcinoma of skin, left upper eyelid    Pure hypercholesterolemia    Adjustment reaction with anxiety and depression    Hepatitis B core antibody positive, IGM           SUBJECTIVE    CHIEF COMPLAINT: Knee Pain    HPI:   Sally Jane is a 64 y.o. female here for evaluation and management of right knee pain. There is not a specific incident that brought about this pain. she has had progressive problems with the knee(s) starting 3 years ago but is now progressing to interfere with activities which include: walking, functional household ADL's, participating in family activities, and enjoying hobbies    Currently the pain in the joint is rated at moderate with activity. The pain is constant and is located in the knee, located laterally. The pain is described as aching, severe, sharp, and throbbing. Relieving factors include rest, over the counter medication , and repositioning.     There is associated Clicking and Popping.     Sally Jane has no additional complaints.     PROGRESSIVE  SYMPTOMS:  Pain worsened by weight bearing  Pain effecting living situation    FUNCTIONAL STATUS:   Walk a block or two on level ground     PREVIOUS TREATMENTS:  Medical: OTC NSAIDS, Steroid Injections, Biologic Injections, and Tylenol  Physical Therapy: Activities Modified   Previous Orthopaedic Surgery: Right Knee Arthroscopsy    REVIEW OF SYSTEMS:  PAIN ASSESSMENT:  See HPI.  MUSCULOSKELETAL: See HPI.  OTHER 10 point review of systems is negative except as stated in HPI above    PAST MEDICAL HISTORY   has a past medical history of Bariatric surgery status, Chronic fatigue and malaise, Hypercholesterolemia (8/20/2021), Hypothyroidism, unspecified, Mucinous carcinoma of skin, left upper eyelid (5/24/2021), and Prediabetes.     PAST SURGICAL HISTORY   has a past surgical history that includes Hysterectomy; Spine surgery; Mandible surgery; Total thyroidectomy; Knee arthroscopy; Tonsillectomy; Adenoidectomy; gastric sleeve; Back surgery; Breast biopsy (Left); Closure of defect of Mohs procedure (Left, 6/30/2021); Canthoplasty (Left, 6/30/2021); and Repair of eyelid (Left, 6/30/2021).     FAMILY HISTORY  family history includes Cancer in her mother; Diabetes in her mother, sister, sister, and sister; Heart disease in her brother and father; Hepatitis in her sister; Hypertension in her brother and sister; No Known Problems in her brother, brother, daughter, and son.     SOCIAL HISTORY   reports that she has never smoked. She has never used smokeless tobacco. She reports current alcohol use of about 2.0 standard drinks of alcohol per week. She reports that she does not use drugs.     ALLERGIES   Review of patient's allergies indicates:   Allergen Reactions    Codeine      Edema      Lipitor [atorvastatin] Hives    Sulfa (sulfonamide antibiotics)         MEDICATIONS  Current Outpatient Medications on File Prior to Visit   Medication Sig Dispense Refill    cholecalciferol, vitamin D3, (VITAMIN D3) 50 mcg (2,000 unit) Tab  "Take 1 tablet (2,000 Units total) by mouth once daily.      ezetimibe (ZETIA) 10 mg tablet Take 1 tablet (10 mg total) by mouth nightly. 90 tablet 3    pravastatin (PRAVACHOL) 10 MG tablet Take 10 mg by mouth once daily.      SYNTHROID 75 mcg tablet Take 1 tablet (75 mcg total) by mouth before breakfast. 90 tablet 3    aspirin (ECOTRIN) 81 MG EC tablet Take 1 tablet (81 mg total) by mouth once daily.  0    busPIRone (BUSPAR) 10 MG tablet Take 1 tablet (10 mg total) by mouth 2 (two) times daily. 60 tablet 2    semaglutide (RYBELSUS) 14 mg tablet        No current facility-administered medications on file prior to visit.          PHYSICAL EXAM   height is 5' 1.77" (1.569 m) and weight is 93.2 kg (205 lb 7.5 oz).   Body mass index is 37.86 kg/m².      All other systems deferred.  GENERAL:  No acute distress  HABITUS: Normal  GAIT: Antalgic  SKIN: Normal , Well healed surgical incision, and No erythema, warmth, fluctuance     KNEE EXAM:    right:   Effusion: Small joint effusion  TTP: Yes over Lateral Joint Line   No crepitus with passive knee ROM  Passive ROM: Extension 0, Flexion 130  No pain with manipulation of patella  Stable to varus/valgus stress. No increased laxity to anterior/posterior drawer testing  negative Barak's test  No pain with IR/ER rotation of the hip  5/5 strength in knee flexion and extension, ankle plantarflexion and dorsiflexion  Neurovascular Status: Sensation intact to light touch in Sural, Saphenous, SPN, DPN, Tibial nerve distribution  2+ pulse DP/PT, normal capillary refill, foot has normal warmth    DATA:  Diagnostic tests reviewed for today's visit:     4v of the knee reveal Severe degenerative changes of the Lateral and Patellofemoral compartment. There is evidence of advanced osteoarthritis changes with Subchondral sclerosis, Osteophyte formation, and Joint space narrowing. The limb is in valgus alignment. The patella is tracking midline.    "

## 2025-02-12 ENCOUNTER — TELEPHONE (OUTPATIENT)
Dept: ORTHOPEDICS | Facility: CLINIC | Age: 65
End: 2025-02-12
Payer: COMMERCIAL

## 2025-02-12 NOTE — TELEPHONE ENCOUNTER
LVM for patient that Her CT was denied by [facility], she will have to self pay for her CT scan for surgery. If the patient has any questions or concerns she can call the clinic at [phone].\          Anna Flores CST  Clinical - OR Assistant to Dr. Ryan Charles [facility]   [phone]

## 2025-02-14 ENCOUNTER — TELEPHONE (OUTPATIENT)
Dept: ORTHOPEDICS | Facility: CLINIC | Age: 65
End: 2025-02-14
Payer: COMMERCIAL

## 2025-02-14 ENCOUNTER — PATIENT MESSAGE (OUTPATIENT)
Dept: ORTHOPEDICS | Facility: CLINIC | Age: 65
End: 2025-02-14
Payer: COMMERCIAL

## 2025-02-14 NOTE — TELEPHONE ENCOUNTER
LVM for patient to call the office back to discuss a couple of things related to surgery. If she can call the office back at [phone].    Anna Flores, YI  Clinical - OR Assistant to Dr. Ryan Charles [facility]   [phone]

## 2025-02-19 ENCOUNTER — HOSPITAL ENCOUNTER (OUTPATIENT)
Dept: RADIOLOGY | Facility: HOSPITAL | Age: 65
Discharge: HOME | End: 2025-02-19
Attending: ORTHOPAEDIC SURGERY

## 2025-02-19 DIAGNOSIS — M17.11 PRIMARY OSTEOARTHRITIS OF RIGHT KNEE: ICD-10-CM

## 2025-02-19 PROCEDURE — 73700 CT LOWER EXTREMITY W/O DYE: CPT | Mod: TC,RT

## 2025-02-21 ENCOUNTER — PATIENT MESSAGE (OUTPATIENT)
Dept: ORTHOPEDICS | Facility: CLINIC | Age: 65
End: 2025-02-21
Payer: COMMERCIAL

## 2025-02-21 ENCOUNTER — TELEPHONE (OUTPATIENT)
Dept: PREADMISSION TESTING | Facility: HOSPITAL | Age: 65
End: 2025-02-21
Payer: COMMERCIAL

## 2025-02-25 ENCOUNTER — TELEPHONE (OUTPATIENT)
Dept: ORTHOPEDICS | Facility: CLINIC | Age: 65
End: 2025-02-25
Payer: COMMERCIAL

## 2025-02-25 NOTE — TELEPHONE ENCOUNTER
----- Message from Quang Dominguez MD sent at 2/25/2025 12:57 PM CST -----  P2P sent for scheduling.

## 2025-02-26 ENCOUNTER — HOSPITAL ENCOUNTER (OUTPATIENT)
Dept: RADIOLOGY | Facility: HOSPITAL | Age: 65
Discharge: HOME | End: 2025-02-26
Attending: ORTHOPAEDIC SURGERY
Payer: COMMERCIAL

## 2025-02-26 ENCOUNTER — HOSPITAL ENCOUNTER (OUTPATIENT)
Dept: PREADMISSION TESTING | Facility: HOSPITAL | Age: 65
Discharge: HOME | End: 2025-02-26
Attending: ORTHOPAEDIC SURGERY
Payer: COMMERCIAL

## 2025-02-26 VITALS
TEMPERATURE: 97 F | HEART RATE: 75 BPM | DIASTOLIC BLOOD PRESSURE: 72 MMHG | OXYGEN SATURATION: 99 % | BODY MASS INDEX: 35.27 KG/M2 | HEIGHT: 64 IN | RESPIRATION RATE: 18 BRPM | SYSTOLIC BLOOD PRESSURE: 123 MMHG

## 2025-02-26 DIAGNOSIS — Z01.818 PREOPERATIVE TESTING: Primary | ICD-10-CM

## 2025-02-26 LAB
ALBUMIN SERPL BCP-MCNC: 3.4 G/DL (ref 3.5–5.2)
ALP SERPL-CCNC: 77 U/L (ref 40–150)
ALT SERPL W/O P-5'-P-CCNC: 15 U/L (ref 10–44)
ANION GAP SERPL CALC-SCNC: 9 MMOL/L (ref 8–16)
APTT PPP: 25.4 SEC (ref 21–32)
AST SERPL-CCNC: 23 U/L (ref 10–40)
BASOPHILS # BLD AUTO: 0.04 K/UL (ref 0–0.2)
BASOPHILS NFR BLD: 0.5 % (ref 0–1.9)
BILIRUB SERPL-MCNC: 0.7 MG/DL (ref 0.1–1)
BUN SERPL-MCNC: 17 MG/DL (ref 8–23)
CALCIUM SERPL-MCNC: 9.1 MG/DL (ref 8.7–10.5)
CHLORIDE SERPL-SCNC: 108 MMOL/L (ref 95–110)
CO2 SERPL-SCNC: 25 MMOL/L (ref 23–29)
CREAT SERPL-MCNC: 0.8 MG/DL (ref 0.5–1.4)
DIFFERENTIAL METHOD BLD: NORMAL
EOSINOPHIL # BLD AUTO: 0.2 K/UL (ref 0–0.5)
EOSINOPHIL NFR BLD: 2.7 % (ref 0–8)
ERYTHROCYTE [DISTWIDTH] IN BLOOD BY AUTOMATED COUNT: 13.2 % (ref 11.5–14.5)
EST. GFR  (NO RACE VARIABLE): >60 ML/MIN/1.73 M^2
GLUCOSE SERPL-MCNC: 108 MG/DL (ref 70–110)
HCT VFR BLD AUTO: 42.3 % (ref 37–48.5)
HGB BLD-MCNC: 13.6 G/DL (ref 12–16)
IMM GRANULOCYTES # BLD AUTO: 0.02 K/UL (ref 0–0.04)
IMM GRANULOCYTES NFR BLD AUTO: 0.3 % (ref 0–0.5)
INR PPP: 0.9 (ref 0.8–1.2)
LYMPHOCYTES # BLD AUTO: 1.5 K/UL (ref 1–4.8)
LYMPHOCYTES NFR BLD: 19.5 % (ref 18–48)
MCH RBC QN AUTO: 27.6 PG (ref 27–31)
MCHC RBC AUTO-ENTMCNC: 32.2 G/DL (ref 32–36)
MCV RBC AUTO: 86 FL (ref 82–98)
MONOCYTES # BLD AUTO: 0.6 K/UL (ref 0.3–1)
MONOCYTES NFR BLD: 7.3 % (ref 4–15)
NEUTROPHILS # BLD AUTO: 5.4 K/UL (ref 1.8–7.7)
NEUTROPHILS NFR BLD: 69.7 % (ref 38–73)
NRBC BLD-RTO: 0 /100 WBC
OHS QRS DURATION: 74 MS
OHS QTC CALCULATION: 435 MS
PLATELET # BLD AUTO: 193 K/UL (ref 150–450)
PMV BLD AUTO: 10 FL (ref 9.2–12.9)
POTASSIUM SERPL-SCNC: 3.8 MMOL/L (ref 3.5–5.1)
PROT SERPL-MCNC: 7.2 G/DL (ref 6–8.4)
PROTHROMBIN TIME: 10.7 SEC (ref 9–12.5)
RBC # BLD AUTO: 4.93 M/UL (ref 4–5.4)
SODIUM SERPL-SCNC: 142 MMOL/L (ref 136–145)
WBC # BLD AUTO: 7.7 K/UL (ref 3.9–12.7)

## 2025-02-26 PROCEDURE — 93005 ELECTROCARDIOGRAM TRACING: CPT

## 2025-02-26 PROCEDURE — 80053 COMPREHEN METABOLIC PANEL: CPT | Performed by: ORTHOPAEDIC SURGERY

## 2025-02-26 PROCEDURE — 93010 ELECTROCARDIOGRAM REPORT: CPT | Mod: ,,, | Performed by: INTERNAL MEDICINE

## 2025-02-26 PROCEDURE — 85025 COMPLETE CBC W/AUTO DIFF WBC: CPT | Performed by: ORTHOPAEDIC SURGERY

## 2025-02-26 PROCEDURE — 71046 X-RAY EXAM CHEST 2 VIEWS: CPT | Mod: 26,,, | Performed by: RADIOLOGY

## 2025-02-26 PROCEDURE — 36415 COLL VENOUS BLD VENIPUNCTURE: CPT | Performed by: ORTHOPAEDIC SURGERY

## 2025-02-26 PROCEDURE — 85610 PROTHROMBIN TIME: CPT | Performed by: ORTHOPAEDIC SURGERY

## 2025-02-26 PROCEDURE — 85730 THROMBOPLASTIN TIME PARTIAL: CPT | Performed by: ORTHOPAEDIC SURGERY

## 2025-02-26 PROCEDURE — 71046 X-RAY EXAM CHEST 2 VIEWS: CPT | Mod: TC,FY

## 2025-02-26 RX ORDER — ALBUTEROL SULFATE 90 UG/1
2 INHALANT RESPIRATORY (INHALATION) EVERY 4 HOURS PRN
COMMUNITY
Start: 2025-02-03

## 2025-02-26 NOTE — DISCHARGE INSTRUCTIONS
YOUR PROCEDURE WILL BE AT [facility] at [address] [place] [address]                 Enter through the Main Entrance facing [address].                 Report to the Same Day Surgery Registration Desk in the hallway.(Just beside the Same Day Surgery Unit)      Your procedure  is scheduled for __3/12/2025________.    Call [phone] between 2pm and 5pm on ___3/11/2025____to find out your arrival time for the day of surgery.    You may have two visitors.  No children under 12 years old.     You will be going to the Same Day Surgery Unit on the 2nd floor of the hospital.    Important instructions:  Do not eat anything after midnight.  You may have plain water, non carbonated.  You may also have Gatorade or Powerade after midnight.    Stop all fluids 2 hours before your surgery.    It is okay to brush your teeth.  Do not have gum, candy or mints.    SEE MEDICATION SHEET.   TAKE MEDICATIONS AS DIRECTED.    All GLP-1 weekly diabetic/weight loss medications must not be taken for one week before your surgery, or your surgery could be canceled.      STOP taking for 7 days before surgery:    Aspirin           Voltaren (Diclofenac)  Ibuprofen  (Advil, Motrin)                Indomethocin  Mobic (meloxicam, celebrex)      Etodolac   Aleve (naproxen)          Toradol (ketoralac)  Fish oil, Krill oil and Vitamin E  Headache Powders (BC Powder, Goody's Powder, Stanback)                           You may take Tylenol if needed which is not a blood thinner.    Please shower the night before and the morning of your surgery.      Use Chlorhexidine soap as instructed by your pre op nurse.   Please place clean linens on your bed the night before surgery. Please wear fresh clean clothing after each shower.    No shaving of procedural area at least 4-5 days before surgery due to increased risk of skin irritation and/or possible infection.    Contact lenses and removable denture work may not  be worn during your procedure.    You may wear deodorant only. If you are having breast surgery, do not wear deodorant on the operative side.    Do not wear powder, body lotion, perfume/cologne or make-up.    Do not wear any jewelry or have any metal on your body.    You will be asked to remove any dentures or partials for the procedure.    If you are going home on the same day of surgery, you must arrange for a family member or a friend to drive you home.  Public transportation is prohibited.  You will not be able to drive home if you were given anesthesia or sedation.    Patients who want to have their Post-op prescriptions filled from our in-house [facility], bring a Credit/Debit Card or cash with you. A co-pay may be required.  The pharmacy closes at 5:30 pm.    Wear loose fitting clothes allowing for bandages.    Please leave money and valuables home.      You may bring your cell phone.    Call the doctor if fever or illness should occur before your surgery.    Call [phone] to contact us here if needed.                            CLOTHES ON DAY OF SURGERY    SHOULDER surgery:  you must have a very oversized shirt.  Very, Very large.  You will probably have a large sling on with your arm strapped to your chest.  You will not be able to put the arm of the operated shoulder into a sleeve.  You can put the arm of the un-operated shoulder into the sleeve, but the shirt will need to be draped over the operated shoulder.       ARM or HAND surgery:  make sure that your sleeves are large and loose enough to pass over large dressings or cast.      BREAST or UNDERARM surgery:  wear a loose, button down shirt so that you can dress without raising your arms over your head.    ABDOMINAL surgery:  wear loose, comfortable clothing.  Nothing tight around the abdomen.  NO JEANS    PENIS or SCROTAL surgery:  loose comfortable clothing.  Large sweat pants, pajama pants or a robe.  ABSOLUTELY NO JEANS      LEG or FOOT  surgery:  wear large loose pants that are able to pass over any large dressings or casts.  You could also wear loose shorts or a skirt.

## 2025-02-26 NOTE — ANESTHESIA PREPROCEDURE EVALUATION
02/26/2025  Sally Jane is a 64 y.o., female scheduled for ARTHROPLASTY, KNEE, TOTAL, USING COMPUTER-ASSISTED NAVIGATION (Right: Knee) on 3/12/2025.      Past Medical History:   Diagnosis Date    Asthma     Bariatric surgery status     Chronic fatigue and malaise     Hypercholesterolemia 08/20/2021    Hypothyroidism, unspecified     Mucinous carcinoma of skin, left upper eyelid 05/24/2021    PONV (postoperative nausea and vomiting)     Prediabetes            Past Surgical History:   Procedure Laterality Date    ADENOIDECTOMY      BACK SURGERY      BREAST BIOPSY Left     CANTHOPLASTY Left 6/30/2021    Procedure: CANTHOPLASTY;  Surgeon: Marion Boo MD;  Location: [address];  Service: Plastics;  Laterality: Left;    CLOSURE OF DEFECT OF MOHS PROCEDURE Left 6/30/2021    Procedure: CLOSURE, MOHS PROCEDURE DEFECT;  Surgeon: Marion Boo MD;  Location: [facility] OR [address] [place] [address];  Service: Plastics;  Laterality: Left;    gastric sleeve      HYSTERECTOMY      KNEE ARTHROSCOPY      MANDIBLE SURGERY      REPAIR OF EYELID Left 6/30/2021    Procedure: REPAIR, EYELID;  Surgeon: Marion Boo MD;  Location: [address];  Service: Plastics;  Laterality: Left;    SPINE SURGERY      TONSILLECTOMY      TOTAL THYROIDECTOMY               Pre-op Assessment    I have reviewed the Patient Summary Reports.     I have reviewed the Nursing Notes. I have reviewed the NPO Status.   I have reviewed the Medications.     Review of Systems  Anesthesia Hx:  No problems with previous Anesthesia             Denies Family Hx of Anesthesia complications.    Denies Personal Hx of Anesthesia complications.                    Social:  Non-Smoker, No Alcohol Use       Hematology/Oncology:  Hematology Normal   Oncology Normal                                   EENT/Dental:  EENT/Dental Normal           Cardiovascular:  Exercise tolerance:  good                     Functional Capacity good / => 4 METS                         Pulmonary:    Asthma                    Renal/:  Renal/ Normal                 Hepatic/GI:     GERD   H/o gastric sleeve             Musculoskeletal:  Arthritis   R knee            Neurological:  Neurology Normal                                      Endocrine:   Hypothyroidism          Dermatological:  Skin Normal        Physical Exam  General: Well nourished, Cooperative, Alert and Oriented    Airway:  Mallampati: II / II  Mouth Opening: Normal  TM Distance: Normal  Tongue: Normal  Neck ROM: Normal ROM    Dental:  Intact    Chest/Lungs:  Clear to auscultation, Normal Respiratory Rate    Heart:  Rate: Normal  Rhythm: Regular Rhythm  Sounds: Normal        Anesthesia Plan  Type of Anesthesia, risks & benefits discussed:    Anesthesia Type: Spinal, Regional  Intra-op Monitoring Plan: Standard ASA Monitors  Post Op Pain Control Plan: multimodal analgesia and peripheral nerve block  Informed Consent: Informed consent signed with the Patient and all parties understand the risks and agree with anesthesia plan.  All questions answered.   ASA Score: 3  Day of Surgery Review of History & Physical: H&P Update referred to the surgeon/provider.    Ready For Surgery From Anesthesia Perspective.     .

## 2025-02-27 ENCOUNTER — TELEPHONE (OUTPATIENT)
Dept: ORTHOPEDICS | Facility: CLINIC | Age: 65
End: 2025-02-27
Payer: COMMERCIAL

## 2025-02-27 NOTE — TELEPHONE ENCOUNTER
----- Message from Quang Dominguez MD sent at 2/27/2025  1:12 PM CST -----  P2P completed.  The procedure is AUTHORIZED.Once the reviewer was sent a photograph of the x-rays showing bone-on-bone, the procedure was authorized.

## 2025-03-10 ENCOUNTER — LAB VISIT (OUTPATIENT)
Dept: LAB | Facility: HOSPITAL | Age: 65
End: 2025-03-10
Attending: ORTHOPAEDIC SURGERY
Payer: MEDICARE

## 2025-03-10 DIAGNOSIS — Z01.818 PREOPERATIVE TESTING: ICD-10-CM

## 2025-03-10 LAB
ABO + RH BLD: NORMAL
BLD GP AB SCN CELLS X3 SERPL QL: NORMAL
SPECIMEN OUTDATE: NORMAL

## 2025-03-10 PROCEDURE — 36415 COLL VENOUS BLD VENIPUNCTURE: CPT | Performed by: ORTHOPAEDIC SURGERY

## 2025-03-10 PROCEDURE — 86900 BLOOD TYPING SEROLOGIC ABO: CPT | Performed by: ORTHOPAEDIC SURGERY

## 2025-03-11 ENCOUNTER — TELEPHONE (OUTPATIENT)
Dept: SURGERY | Facility: HOSPITAL | Age: 65
End: 2025-03-11
Payer: MEDICARE

## 2025-03-11 ENCOUNTER — TELEPHONE (OUTPATIENT)
Dept: ORTHOPEDICS | Facility: CLINIC | Age: 65
End: 2025-03-11
Payer: MEDICARE

## 2025-03-11 NOTE — TELEPHONE ENCOUNTER
[facility] for patient the surgery dept. Will call her with arrival time between 2 & 5 pm.      ----- Message from Vannessa sent at 3/11/2025  2:59 PM CDT -----  Regarding: appt  Name of Who is Calling:Pt What is the request in detail: Pt needs surgery time for 2m Can the clinic reply by [facility]: yes What Number to Call Back if not in [facility]:Telephone Information:Mobile          [phone]

## 2025-03-12 ENCOUNTER — ANESTHESIA (OUTPATIENT)
Dept: SURGERY | Facility: HOSPITAL | Age: 65
End: 2025-03-12
Payer: MEDICARE

## 2025-03-12 ENCOUNTER — HOSPITAL ENCOUNTER (OUTPATIENT)
Facility: HOSPITAL | Age: 65
Discharge: HOME HEALTH | End: 2025-03-12
Attending: ORTHOPAEDIC SURGERY | Admitting: ORTHOPAEDIC SURGERY
Payer: MEDICARE

## 2025-03-12 DIAGNOSIS — M17.11 OSTEOARTHRITIS OF RIGHT KNEE, UNSPECIFIED OSTEOARTHRITIS TYPE: ICD-10-CM

## 2025-03-12 DIAGNOSIS — M17.11 PRIMARY OSTEOARTHRITIS OF RIGHT KNEE: Primary | ICD-10-CM

## 2025-03-12 PROCEDURE — 36000712 HC OR TIME LEV V 1ST 15 MIN: Performed by: ORTHOPAEDIC SURGERY

## 2025-03-12 PROCEDURE — 71000039 HC RECOVERY, EACH ADD'L HOUR: Performed by: ORTHOPAEDIC SURGERY

## 2025-03-12 PROCEDURE — 97165 OT EVAL LOW COMPLEX 30 MIN: CPT

## 2025-03-12 PROCEDURE — 97530 THERAPEUTIC ACTIVITIES: CPT

## 2025-03-12 PROCEDURE — 36000713 HC OR TIME LEV V EA ADD 15 MIN: Performed by: ORTHOPAEDIC SURGERY

## 2025-03-12 PROCEDURE — 63600175 PHARM REV CODE 636 W HCPCS: Performed by: ANESTHESIOLOGY

## 2025-03-12 PROCEDURE — 63600175 PHARM REV CODE 636 W HCPCS: Performed by: NURSE ANESTHETIST, CERTIFIED REGISTERED

## 2025-03-12 PROCEDURE — 25000003 PHARM REV CODE 250: Performed by: ANESTHESIOLOGY

## 2025-03-12 PROCEDURE — 63600175 PHARM REV CODE 636 W HCPCS: Mod: JZ,TB | Performed by: ANESTHESIOLOGY

## 2025-03-12 PROCEDURE — 97161 PT EVAL LOW COMPLEX 20 MIN: CPT

## 2025-03-12 PROCEDURE — 25000003 PHARM REV CODE 250: Performed by: ORTHOPAEDIC SURGERY

## 2025-03-12 PROCEDURE — 97110 THERAPEUTIC EXERCISES: CPT

## 2025-03-12 PROCEDURE — 27447 TOTAL KNEE ARTHROPLASTY: CPT | Mod: RT,,, | Performed by: ORTHOPAEDIC SURGERY

## 2025-03-12 PROCEDURE — C1713 ANCHOR/SCREW BN/BN,TIS/BN: HCPCS | Performed by: ORTHOPAEDIC SURGERY

## 2025-03-12 PROCEDURE — 71000015 HC POSTOP RECOV 1ST HR: Performed by: ORTHOPAEDIC SURGERY

## 2025-03-12 PROCEDURE — 25000003 PHARM REV CODE 250: Performed by: NURSE ANESTHETIST, CERTIFIED REGISTERED

## 2025-03-12 PROCEDURE — C1776 JOINT DEVICE (IMPLANTABLE): HCPCS | Performed by: ORTHOPAEDIC SURGERY

## 2025-03-12 PROCEDURE — 63600175 PHARM REV CODE 636 W HCPCS: Performed by: ORTHOPAEDIC SURGERY

## 2025-03-12 PROCEDURE — 37000008 HC ANESTHESIA 1ST 15 MINUTES: Performed by: ORTHOPAEDIC SURGERY

## 2025-03-12 PROCEDURE — 71000033 HC RECOVERY, INTIAL HOUR: Performed by: ORTHOPAEDIC SURGERY

## 2025-03-12 PROCEDURE — 27201423 OPTIME MED/SURG SUP & DEVICES STERILE SUPPLY: Performed by: ORTHOPAEDIC SURGERY

## 2025-03-12 PROCEDURE — 37000009 HC ANESTHESIA EA ADD 15 MINS: Performed by: ORTHOPAEDIC SURGERY

## 2025-03-12 PROCEDURE — 0055T BONE SRGRY CMPTR CT/MRI IMAG: CPT | Mod: ,,, | Performed by: ORTHOPAEDIC SURGERY

## 2025-03-12 DEVICE — PRIMARY TIBIAL BASEPLATE
Type: IMPLANTABLE DEVICE | Site: KNEE | Status: FUNCTIONAL
Brand: TRIATHLON

## 2025-03-12 DEVICE — TIBIAL BEARING INSERT - CS
Type: IMPLANTABLE DEVICE | Site: KNEE | Status: FUNCTIONAL
Brand: TRIATHLON

## 2025-03-12 DEVICE — SIMPLEX® HV WITH GENTAMICIN IS A FAST-SETTING ACRYLIC RESIN WITH ADDITION OF GENTAMICIN SULFATE FOR USE IN BONE SURGERY. MIXING THE TWO SEPARATE STERILE COMPONENTS PRODUCES A DUCTILE BONE CEMENT WHICH, AFTER HARDENING, FIXES THE IMPLANT AND TRANSFERS STRESSES PRODUCED DURING MOVEMENT EVENLY TO THE BONE. SIMPLEX® HV WITH GENTAMICINN CEMENT POWDER ALSO CONTAINS INSOLUBLE ZIRCONIUM DIOXIDE AS AN X-RAY CONTRAST MEDIUM. SIMPLEX® HV WITH GENTAMICIN DOES NOT EMIT A SIGNAL AND DOES NOT POSE A SAFETY RISK IN A MAGNETIC RESONANCE ENVIRONMENT.
Type: IMPLANTABLE DEVICE | Site: KNEE | Status: FUNCTIONAL
Brand: SIMPLEX HV WITH GENTAMICIN

## 2025-03-12 DEVICE — CRUCIATE RETAINING FEMORAL
Type: IMPLANTABLE DEVICE | Site: KNEE | Status: FUNCTIONAL
Brand: TRIATHLON

## 2025-03-12 RX ORDER — MIDAZOLAM HYDROCHLORIDE 1 MG/ML
INJECTION INTRAMUSCULAR; INTRAVENOUS
Status: DISCONTINUED | OUTPATIENT
Start: 2025-03-12 | End: 2025-03-12

## 2025-03-12 RX ORDER — OXYCODONE HYDROCHLORIDE 5 MG/1
5 TABLET ORAL
Status: DISCONTINUED | OUTPATIENT
Start: 2025-03-12 | End: 2025-03-12 | Stop reason: HOSPADM

## 2025-03-12 RX ORDER — TRANEXAMIC ACID 10 MG/ML
1000 INJECTION, SOLUTION INTRAVENOUS
Status: COMPLETED | OUTPATIENT
Start: 2025-03-12 | End: 2025-03-12

## 2025-03-12 RX ORDER — AMOXICILLIN 250 MG
1 CAPSULE ORAL 2 TIMES DAILY
Status: DISCONTINUED | OUTPATIENT
Start: 2025-03-12 | End: 2025-03-12 | Stop reason: HOSPADM

## 2025-03-12 RX ORDER — DOCUSATE SODIUM 100 MG/1
100 CAPSULE, LIQUID FILLED ORAL 2 TIMES DAILY
Qty: 30 CAPSULE | Refills: 0 | Status: SHIPPED | OUTPATIENT
Start: 2025-03-12

## 2025-03-12 RX ORDER — HYDROMORPHONE HYDROCHLORIDE 2 MG/ML
0.2 INJECTION, SOLUTION INTRAMUSCULAR; INTRAVENOUS; SUBCUTANEOUS EVERY 5 MIN PRN
Status: DISCONTINUED | OUTPATIENT
Start: 2025-03-12 | End: 2025-03-12 | Stop reason: HOSPADM

## 2025-03-12 RX ORDER — ACETAMINOPHEN 500 MG
1000 TABLET ORAL
Status: COMPLETED | OUTPATIENT
Start: 2025-03-12 | End: 2025-03-12

## 2025-03-12 RX ORDER — ACETAMINOPHEN 500 MG
1000 TABLET ORAL EVERY 6 HOURS
Status: DISCONTINUED | OUTPATIENT
Start: 2025-03-12 | End: 2025-03-12 | Stop reason: HOSPADM

## 2025-03-12 RX ORDER — METHOCARBAMOL 500 MG/1
500 TABLET, FILM COATED ORAL 4 TIMES DAILY
Qty: 40 TABLET | Refills: 0 | Status: SHIPPED | OUTPATIENT
Start: 2025-03-12 | End: 2025-03-22

## 2025-03-12 RX ORDER — MEPERIDINE HYDROCHLORIDE 25 MG/ML
12.5 INJECTION INTRAMUSCULAR; INTRAVENOUS; SUBCUTANEOUS EVERY 10 MIN PRN
Status: DISCONTINUED | OUTPATIENT
Start: 2025-03-12 | End: 2025-03-12 | Stop reason: HOSPADM

## 2025-03-12 RX ORDER — SODIUM CHLORIDE 0.9 % (FLUSH) 0.9 %
10 SYRINGE (ML) INJECTION
Status: DISCONTINUED | OUTPATIENT
Start: 2025-03-12 | End: 2025-03-12 | Stop reason: HOSPADM

## 2025-03-12 RX ORDER — ACETAMINOPHEN 500 MG
1000 TABLET ORAL
Status: DISCONTINUED | OUTPATIENT
Start: 2025-03-12 | End: 2025-03-12 | Stop reason: SDUPTHER

## 2025-03-12 RX ORDER — ROPIVACAINE/EPI/CLONIDINE/KET 2.46-0.005
SYRINGE (ML) INJECTION
Status: DISCONTINUED | OUTPATIENT
Start: 2025-03-12 | End: 2025-03-12 | Stop reason: HOSPADM

## 2025-03-12 RX ORDER — ACETAMINOPHEN 500 MG
1000 TABLET ORAL EVERY 8 HOURS PRN
Qty: 42 TABLET | Refills: 0 | Status: SHIPPED | OUTPATIENT
Start: 2025-03-12

## 2025-03-12 RX ORDER — FENTANYL CITRATE 50 UG/ML
INJECTION, SOLUTION INTRAMUSCULAR; INTRAVENOUS
Status: DISCONTINUED | OUTPATIENT
Start: 2025-03-12 | End: 2025-03-12

## 2025-03-12 RX ORDER — CEFAZOLIN 2 G/1
2 INJECTION, POWDER, FOR SOLUTION INTRAMUSCULAR; INTRAVENOUS
Status: COMPLETED | OUTPATIENT
Start: 2025-03-12 | End: 2025-03-12

## 2025-03-12 RX ORDER — OXYCODONE HYDROCHLORIDE 5 MG/1
10 TABLET ORAL
Status: DISCONTINUED | OUTPATIENT
Start: 2025-03-12 | End: 2025-03-12 | Stop reason: HOSPADM

## 2025-03-12 RX ORDER — LIDOCAINE HYDROCHLORIDE 10 MG/ML
1 INJECTION, SOLUTION EPIDURAL; INFILTRATION; INTRACAUDAL; PERINEURAL ONCE
Status: DISCONTINUED | OUTPATIENT
Start: 2025-03-12 | End: 2025-03-12 | Stop reason: SDUPTHER

## 2025-03-12 RX ORDER — PHENYLEPHRINE HYDROCHLORIDE 10 MG/ML
INJECTION INTRAVENOUS
Status: DISCONTINUED | OUTPATIENT
Start: 2025-03-12 | End: 2025-03-12

## 2025-03-12 RX ORDER — PREGABALIN 75 MG/1
75 CAPSULE ORAL 2 TIMES DAILY
Qty: 28 CAPSULE | Refills: 0 | Status: SHIPPED | OUTPATIENT
Start: 2025-03-12 | End: 2025-03-26

## 2025-03-12 RX ORDER — SODIUM CHLORIDE 9 MG/ML
INJECTION, SOLUTION INTRAVENOUS CONTINUOUS
Status: DISCONTINUED | OUTPATIENT
Start: 2025-03-12 | End: 2025-03-12 | Stop reason: HOSPADM

## 2025-03-12 RX ORDER — FENTANYL/BUPIVACAINE/NS/PF 2MCG/ML-.1
PLASTIC BAG, INJECTION (ML) INJECTION
Status: DISCONTINUED | OUTPATIENT
Start: 2025-03-12 | End: 2025-03-12

## 2025-03-12 RX ORDER — ASPIRIN 81 MG/1
81 TABLET ORAL 2 TIMES DAILY
Qty: 60 TABLET | Refills: 0 | Status: SHIPPED | OUTPATIENT
Start: 2025-03-12 | End: 2025-04-11

## 2025-03-12 RX ORDER — POLYETHYLENE GLYCOL 3350 17 G/17G
17 POWDER, FOR SOLUTION ORAL DAILY
Status: DISCONTINUED | OUTPATIENT
Start: 2025-03-12 | End: 2025-03-12 | Stop reason: HOSPADM

## 2025-03-12 RX ORDER — LIDOCAINE HYDROCHLORIDE 10 MG/ML
1 INJECTION, SOLUTION EPIDURAL; INFILTRATION; INTRACAUDAL; PERINEURAL
Status: DISCONTINUED | OUTPATIENT
Start: 2025-03-12 | End: 2025-03-12 | Stop reason: HOSPADM

## 2025-03-12 RX ORDER — METHOCARBAMOL 750 MG/1
750 TABLET, FILM COATED ORAL 3 TIMES DAILY
Status: DISCONTINUED | OUTPATIENT
Start: 2025-03-12 | End: 2025-03-12 | Stop reason: HOSPADM

## 2025-03-12 RX ORDER — DEXAMETHASONE SODIUM PHOSPHATE 4 MG/ML
INJECTION, SOLUTION INTRA-ARTICULAR; INTRALESIONAL; INTRAMUSCULAR; INTRAVENOUS; SOFT TISSUE
Status: DISCONTINUED | OUTPATIENT
Start: 2025-03-12 | End: 2025-03-12

## 2025-03-12 RX ORDER — OXYCODONE HYDROCHLORIDE 5 MG/1
5-10 TABLET ORAL EVERY 4 HOURS PRN
Qty: 40 TABLET | Refills: 0 | Status: SHIPPED | OUTPATIENT
Start: 2025-03-12

## 2025-03-12 RX ORDER — ASPIRIN 81 MG/1
81 TABLET ORAL 2 TIMES DAILY
Status: DISCONTINUED | OUTPATIENT
Start: 2025-03-12 | End: 2025-03-12 | Stop reason: HOSPADM

## 2025-03-12 RX ORDER — PROPOFOL 10 MG/ML
VIAL (ML) INTRAVENOUS CONTINUOUS PRN
Status: DISCONTINUED | OUTPATIENT
Start: 2025-03-12 | End: 2025-03-12

## 2025-03-12 RX ORDER — CELECOXIB 200 MG/1
200 CAPSULE ORAL 2 TIMES DAILY
Qty: 14 CAPSULE | Refills: 0 | Status: SHIPPED | OUTPATIENT
Start: 2025-03-12

## 2025-03-12 RX ORDER — CELECOXIB 100 MG/1
400 CAPSULE ORAL ONCE
Status: COMPLETED | OUTPATIENT
Start: 2025-03-12 | End: 2025-03-12

## 2025-03-12 RX ORDER — PROCHLORPERAZINE EDISYLATE 5 MG/ML
5 INJECTION INTRAMUSCULAR; INTRAVENOUS EVERY 6 HOURS PRN
Status: DISCONTINUED | OUTPATIENT
Start: 2025-03-12 | End: 2025-03-12 | Stop reason: HOSPADM

## 2025-03-12 RX ORDER — ONDANSETRON HYDROCHLORIDE 2 MG/ML
INJECTION, SOLUTION INTRAVENOUS
Status: DISCONTINUED | OUTPATIENT
Start: 2025-03-12 | End: 2025-03-12

## 2025-03-12 RX ORDER — BUPIVACAINE HYDROCHLORIDE 7.5 MG/ML
INJECTION INTRAVENOUS
Status: DISCONTINUED | OUTPATIENT
Start: 2025-03-12 | End: 2025-03-12

## 2025-03-12 RX ORDER — SODIUM CHLORIDE 9 MG/ML
INJECTION, SOLUTION INTRAVENOUS
Status: DISCONTINUED | OUTPATIENT
Start: 2025-03-12 | End: 2025-03-12 | Stop reason: HOSPADM

## 2025-03-12 RX ORDER — NALOXONE HCL 0.4 MG/ML
0.02 VIAL (ML) INJECTION
Status: DISCONTINUED | OUTPATIENT
Start: 2025-03-12 | End: 2025-03-12 | Stop reason: HOSPADM

## 2025-03-12 RX ORDER — SCOPOLAMINE 1 MG/3D
PATCH, EXTENDED RELEASE TRANSDERMAL
Status: DISCONTINUED | OUTPATIENT
Start: 2025-03-12 | End: 2025-03-12

## 2025-03-12 RX ORDER — FAMOTIDINE 20 MG/1
20 TABLET, FILM COATED ORAL 2 TIMES DAILY
Status: DISCONTINUED | OUTPATIENT
Start: 2025-03-12 | End: 2025-03-12 | Stop reason: HOSPADM

## 2025-03-12 RX ORDER — BUPIVACAINE HYDROCHLORIDE 5 MG/ML
INJECTION, SOLUTION PERINEURAL
Status: DISCONTINUED | OUTPATIENT
Start: 2025-03-12 | End: 2025-03-12

## 2025-03-12 RX ORDER — CEFAZOLIN 2 G/1
2 INJECTION, POWDER, FOR SOLUTION INTRAMUSCULAR; INTRAVENOUS
Status: DISCONTINUED | OUTPATIENT
Start: 2025-03-12 | End: 2025-03-12 | Stop reason: HOSPADM

## 2025-03-12 RX ORDER — ONDANSETRON HYDROCHLORIDE 2 MG/ML
4 INJECTION, SOLUTION INTRAVENOUS EVERY 8 HOURS PRN
Status: DISCONTINUED | OUTPATIENT
Start: 2025-03-12 | End: 2025-03-12 | Stop reason: HOSPADM

## 2025-03-12 RX ORDER — MUPIROCIN 20 MG/G
1 OINTMENT TOPICAL
Status: COMPLETED | OUTPATIENT
Start: 2025-03-12 | End: 2025-03-12

## 2025-03-12 RX ORDER — SODIUM CHLORIDE, SODIUM LACTATE, POTASSIUM CHLORIDE, CALCIUM CHLORIDE 600; 310; 30; 20 MG/100ML; MG/100ML; MG/100ML; MG/100ML
INJECTION, SOLUTION INTRAVENOUS CONTINUOUS
Status: DISCONTINUED | OUTPATIENT
Start: 2025-03-12 | End: 2025-03-12 | Stop reason: HOSPADM

## 2025-03-12 RX ORDER — DIPHENHYDRAMINE HYDROCHLORIDE 50 MG/ML
25 INJECTION, SOLUTION INTRAMUSCULAR; INTRAVENOUS EVERY 6 HOURS PRN
Status: DISCONTINUED | OUTPATIENT
Start: 2025-03-12 | End: 2025-03-12 | Stop reason: HOSPADM

## 2025-03-12 RX ADMIN — CELECOXIB 400 MG: 100 CAPSULE ORAL at 08:03

## 2025-03-12 RX ADMIN — HYDROMORPHONE HYDROCHLORIDE 0.2 MG: 2 INJECTION INTRAMUSCULAR; INTRAVENOUS; SUBCUTANEOUS at 12:03

## 2025-03-12 RX ADMIN — TRANEXAMIC ACID 1000 MG: 10 INJECTION, SOLUTION INTRAVENOUS at 09:03

## 2025-03-12 RX ADMIN — ONDANSETRON 4 MG: 2 INJECTION, SOLUTION INTRAMUSCULAR; INTRAVENOUS at 09:03

## 2025-03-12 RX ADMIN — PREGABALIN 75 MG: 50 CAPSULE ORAL at 08:03

## 2025-03-12 RX ADMIN — FENTANYL CITRATE 100 MCG: 50 INJECTION, SOLUTION INTRAMUSCULAR; INTRAVENOUS at 08:03

## 2025-03-12 RX ADMIN — PHENYLEPHRINE HYDROCHLORIDE 100 MCG: 10 INJECTION INTRAVENOUS at 09:03

## 2025-03-12 RX ADMIN — BUPIVACAINE HYDROCHLORIDE IN DEXTROSE 2 ML: 7.5 INJECTION, SOLUTION SUBARACHNOID at 09:03

## 2025-03-12 RX ADMIN — SCOPALAMINE 1 PATCH: 1 PATCH, EXTENDED RELEASE TRANSDERMAL at 09:03

## 2025-03-12 RX ADMIN — BUPIVACAINE HYDROCHLORIDE 20 ML: 5 INJECTION, SOLUTION PERINEURAL at 09:03

## 2025-03-12 RX ADMIN — TRANEXAMIC ACID 1000 MG: 10 INJECTION, SOLUTION INTRAVENOUS at 10:03

## 2025-03-12 RX ADMIN — HYDROMORPHONE HYDROCHLORIDE 0.2 MG: 2 INJECTION INTRAMUSCULAR; INTRAVENOUS; SUBCUTANEOUS at 03:03

## 2025-03-12 RX ADMIN — ASPIRIN 81 MG: 81 TABLET, COATED ORAL at 03:03

## 2025-03-12 RX ADMIN — MUPIROCIN 1 G: 20 OINTMENT TOPICAL at 08:03

## 2025-03-12 RX ADMIN — ACETAMINOPHEN 1000 MG: 500 TABLET ORAL at 08:03

## 2025-03-12 RX ADMIN — PHENYLEPHRINE HYDROCHLORIDE 200 MCG: 10 INJECTION INTRAVENOUS at 09:03

## 2025-03-12 RX ADMIN — PHENYLEPHRINE HYDROCHLORIDE 200 MCG: 10 INJECTION INTRAVENOUS at 10:03

## 2025-03-12 RX ADMIN — SODIUM CHLORIDE, SODIUM LACTATE, POTASSIUM CHLORIDE, AND CALCIUM CHLORIDE: .6; .31; .03; .02 INJECTION, SOLUTION INTRAVENOUS at 09:03

## 2025-03-12 RX ADMIN — METHOCARBAMOL 750 MG: 750 TABLET ORAL at 03:03

## 2025-03-12 RX ADMIN — CEFAZOLIN 2 G: 2 INJECTION, POWDER, FOR SOLUTION INTRAMUSCULAR; INTRAVENOUS at 09:03

## 2025-03-12 RX ADMIN — PROPOFOL 150 MCG/KG/MIN: 10 INJECTION, EMULSION INTRAVENOUS at 09:03

## 2025-03-12 RX ADMIN — GLYCOPYRROLATE 0.1 MG: 0.2 INJECTION, SOLUTION INTRAMUSCULAR; INTRAVITREAL at 09:03

## 2025-03-12 RX ADMIN — MIDAZOLAM HYDROCHLORIDE 2 MG: 1 INJECTION INTRAMUSCULAR; INTRAVENOUS at 08:03

## 2025-03-12 RX ADMIN — DEXAMETHASONE SODIUM PHOSPHATE 4 MG: 4 INJECTION, SOLUTION INTRAMUSCULAR; INTRAVENOUS at 09:03

## 2025-03-12 RX ADMIN — FAMOTIDINE 20 MG: 20 TABLET, FILM COATED ORAL at 03:03

## 2025-03-12 NOTE — OR NURSING
0853 - Time out done for Block per Dr Davies  Pt on monitors   0903 - Block complete - pt tolerated well

## 2025-03-12 NOTE — PLAN OF CARE
Pt drinking and eating ,up to void wo diff.deneis any discomfort at this time.pt and family vb undestanding of all instuctions rx and polar ice,pt desires t o go home

## 2025-03-12 NOTE — ANESTHESIA PROCEDURE NOTES
Peripheral Block    Patient location during procedure: OR   Block not for primary anesthetic.  Reason for block: at surgeon's request and post-op pain management   Post-op Pain Location: right knee osteoarthritis   Start time: 3/12/2025 8:58 AM  Timeout: 3/12/2025 8:57 AM   End time: 3/12/2025 9:03 AM    Staffing  Authorizing Provider: Naun Davies II, MD  Performing Provider: Naun Davies II, MD    Staffing  Performed by: Naun Davies II, MD  Authorized by: Naun Davies II, MD    Preanesthetic Checklist  Completed: patient identified, IV checked, site marked, risks and benefits discussed, surgical consent, monitors and equipment checked, pre-op evaluation and timeout performed  Peripheral Block  Patient position: sitting  Prep: ChloraPrep  Patient monitoring: heart rate, cardiac monitor, continuous pulse ox and frequent blood pressure checks  Block type: adductor canal  Laterality: right  Injection technique: single shot  Needle  Needle type: Echogenic   Needle gauge: 22 G  Needle length: 4 in  Needle localization: anatomical landmarks and ultrasound guidance     Assessment  Injection assessment: negative aspiration and negative parasthesia  Paresthesia pain: none  Heart rate change: no  Slow fractionated injection: yes  Pain Tolerance: comfortable throughout block and no complaints      Additional Notes  NO APPARENT ANESTHESIA COMPLICATIONS

## 2025-03-12 NOTE — PT/OT/SLP EVAL
Physical Therapy Evaluation and Discharge Home Today    Patient Name:  Sally Jane   MRN:  [id number]    Recommendations:     Discharge Recommendations: Low Intensity Therapy  Discharge Equipment Recommendations: none   Barriers to discharge: None    Assessment:     Sally Jane is a 64 y.o. female admitted with a medical diagnosis of <principal problem not specified>.     Recent Surgery: Procedure(s) (LRB):  ARTHROPLASTY, KNEE, TOTAL, USING COMPUTER-ASSISTED NAVIGATION (Right) * Day of Surgery *    Plan:     Pt to be D/C'ed home today.       Subjective     Chief Complaint: N/A  Patient/Family Comments/goals: Pt ready to go home.   Pain/Comfort:  Pain Rating 1: 0/10      Living Environment:  Pt lives alone in a H with ramp at entry.  Pt has a niece that lives next door.   Prior to admission, patients level of function was independent, driving, and working.  Equipment at home: walker, rolling, shower chair, grab bar, raised toilet.  Upon discharge, patient will have assistance from family.  Pt has children and niece.      Objective:     Communicated with PACU nurse prior to session.  Patient found HOB elevated with blood pressure cuff, pulse ox (continuous), telemetry, peripheral IV, cryotherapy, SCD, FCD upon PT entry to room.    General Precautions: Standard, fall    Orthopedic Precautions:RLE weight bearing as tolerated   Braces: N/A  Respiratory Status: Room air    Exams:  Cognitive Exam:  Patient was able to follow multiple commands.   Gross Motor Coordination:  WFL  Postural Exam:  Patient presented with the following abnormalities:    -       Rounded shoulders  Sensation:    -       Intact  light/touch BLE  Skin Integrity/Edema:      -       Skin integrity: Visible skin intact and RLE dressing intact/dry  -       Edema: Mild RLE  BLE ROM: WFL, R knee ROM ~5-85*  BLE Strength: WFL    Functional Mobility:  Bed Mobility:     Scooting: stand by assistance and contact guard assistance  Supine to Sit:  minimum assistance with RLE  Sit to Supine: minimum assistance with RLE  Transfers:     Sit to Stand: contact guard assistance with rolling walker  Gait: Pt ambulated ~100 ft with CGA using RW.  HR ~56-66 bpm and spO2 on RA ~95%.  Pt with decreased step length and carlo.     Balance: Pt with fair+ dynamic standing balance.     AM-PAC 6 CLICK MOBILITY  Total Score:20       Treatment and Education:  RLE seated therex x10 reps: hip flex, LAQ, HS, and B AP's  RLE supine therex x10 reps: QS    Pt able to attend [employer] and already received HEP for LE therex s/p TKA.  Pt encouraged to perform LE therex daily.  Pt educated on AROM of R knee, NO pillow under knee at any time.  Pt also educated on cryotherapy for R knee.  Pt verbalized good understanding of all education.         Patient left HOB elevated with all lines intact and call button in reach.  Cryotherapy, SCD/FCD reapplied.     GOALS:   Multidisciplinary Problems       Physical Therapy Goals          Problem: Physical Therapy    Goal Priority Disciplines Outcome Interventions   Physical Therapy Goal     PT, PT/OT Progressing                        History:     Past Medical History:   Diagnosis Date    Asthma     Bariatric surgery status     Chronic fatigue and malaise     Hypercholesterolemia 08/20/2021    Hypothyroidism, unspecified     Mucinous carcinoma of skin, left upper eyelid 05/24/2021    PONV (postoperative nausea and vomiting)     Prediabetes     Unspecified chronic bronchitis        Past Surgical History:   Procedure Laterality Date    ADENOIDECTOMY      BACK SURGERY      BREAST BIOPSY Left     CANTHOPLASTY Left 6/30/2021    Procedure: CANTHOPLASTY;  Surgeon: Marion Boo MD;  Location: [facility] OR [address] [place] [address];  Service: Plastics;  Laterality: Left;    CLOSURE OF DEFECT OF MOHS PROCEDURE Left 6/30/2021    Procedure: CLOSURE, MOHS PROCEDURE DEFECT;  Surgeon: Marion Boo MD;  Location: [facility] OR [address] [place] [address];  Service: Plastics;  Laterality: Left;    gastric  sleeve      HYSTERECTOMY      KNEE ARTHROSCOPY      MANDIBLE SURGERY      REPAIR OF EYELID Left 6/30/2021    Procedure: REPAIR, EYELID;  Surgeon: Marion Boo MD;  Location: [facility] OR [address] [place] [address];  Service: Plastics;  Laterality: Left;    SPINE SURGERY      TONSILLECTOMY      TOTAL THYROIDECTOMY         Time Tracking:     PT Received On: 03/12/25  PT Start Time: 1622     PT Stop Time: 1647  PT Total Time (min): 25 min     Billable Minutes: Evaluation 15 min co-eval with OT and Therapeutic Exercise 10 min      03/12/2025

## 2025-03-12 NOTE — PT/OT/SLP EVAL
Occupational Therapy Evaluation and Discharge     Name: Sally Jane  MRN: [id number]  Admitting Diagnosis: <principal problem not specified> * Day of Surgery *  Recent Surgery: Procedure(s) (LRB):  ARTHROPLASTY, KNEE, TOTAL, USING COMPUTER-ASSISTED NAVIGATION (Right) * Day of Surgery *    Recommendations:     Discharge Recommendations: No Therapy Indicated  Level of Assistance Recommended:  None   Discharge Equipment Recommendations: none  Barriers to discharge: None    Assessment:     Sally Jane is a 64 y.o. female with a medical diagnosis of <principal problem not specified>. She presents with performance deficits affecting function including  mild right  lower extremity pain .  Patient walked with RW with CGA to SBA with no problems noted with initial BP at 115/63.     Rehab Prognosis: Good; patient would benefit from acute OT services to address these deficits and reach maximum level of function.    Plan:     Plan of Care Expires: 03/12/25  Plan of Care Reviewed with: patient    Subjective     Chief Complaint: none   Patient Comments/Goals: pt. Said her niece lives next door   Pain/Comfort:  Pain Rating 1: 0/10    Patients cultural, spiritual, Oriental orthodox conflicts given the current situation: no    Social History:  Living Environment: Patient lives alone in a single story home with ramped  Prior Level of Function: Prior to admission, patient was independent.; has a niece whom lives next door who can drive and get groceries and items for her   Roles and Routines: Patient was driving and working as a  at the [employer]  prior to admission.  Equipment Used at Home: other (see comments), grab bar, walker, rolling, shower chair, raised toilet (WIS with chair)  DME owned (not currently used): none  Assistance Upon Discharge: family    Objective:     Communicated with RN prior to session. Patient found HOB elevated with telemetry, pulse ox (continuous), SCD, cryotherapy upon OT entry to  room.    General Precautions: Standard, fall   Orthopedic Precautions: Full weight bearing   Braces: N/A    Respiratory Status: Room air    Occupational Performance    Gait belt applied - Yes    Bed Mobility:   Rolling/Turning to Right with stand by assistance  Scooting anteriorly to EOB to have both feet planted on floor: stand by assistance  Supine to sit from right side of bed with stand by assistance    Functional Mobility/Transfers:  Sit <> Stand Transfer with stand by assistance with rolling walker  Functional Mobility: Patient walked from bed/stretcher in room x    ' with RW with CGA to SBA. She did not need to use the bathroom while up and walking.     Activities of Daily Living:  Upper Body Dressing: set up assistance  Lower Body Dressing: set up assistance    Cognitive/Visual Perceptual:  Cognitive/Psychosocial Skills:    -     Oriented to: Person, Place, Time, Situation  -     Follows Commands/attention: Follows multistep  commands  -     Communication: clear/fluent  -     Memory: No Deficits noted  -     Safety awareness/insight to disability: intact  -     Mood/Affect/Coping skills/emotional control: Appropriate to situation  Visual/Perceptual:    -     Intact    Physical Exam:  Balance:    -     Sitting: stand by assistance  -     Standing: stand by assistance and contact guard assistance  Postural examination/scapula alignment:    -       No postural abnormalities identified  Skin integrity: Visible skin intact  Edema:  Mild right lower extremity   Sensation:    -       Intact  Motor Planning: Intact  Dominant hand: Right  Upper Extremity Range of Motion:     -       Right Upper Extremity: WNL  -       Left Upper Extremity: WNL  Upper Extremity Strength:    -       Right Upper Extremity: WNL  -       Left Upper Extremity: WNL   Strength:    -       Right Upper Extremity: WNL  -       Left Upper Extremity: WNL  Fine Motor Coordination: -       Intact  Gross motor coordination:   WFL    AMPAC 6  Click ADL:  AMPAC Total Score: 24    Treatment & Education:  Educated on the importance of mobility to maximize recovery  Educated on the importance of OOB mobility within safe range in order to decrease adverse effects of prolonged bedrest  Educated on safety with functional mobility; hand placement to ensure safe transfers to various surfaces in prep for ADLs  Educated on performing functional mobility and ADLs in adherence to orthopedic precautions  Educated on weight bearing status  Will continue to educate as needed  Patient educated re: no need for additional DME at home  She said niece lives next door     Patient clear to ambulate to/from bathroom with RN/PCT, assist x1  .    Patient left sitting edge of bed with all lines intact, call button in reach, RN notified, and PT present.    GOALS:   Multidisciplinary Problems       Occupational Therapy Goals       Not on file              Multidisciplinary Problems (Resolved)          Problem: Occupational Therapy    Goal Priority Disciplines Outcome Interventions   Occupational Therapy Goal   (Resolved)     OT, PT/OT Met    Description: Patient ready for discharge and no occupational therapy or DME needed for home. Occupational therapy to sign off.                        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 OR [address] [place] [address];  Service: Plastics;  Laterality: Left;    gastric sleeve      HYSTERECTOMY      KNEE ARTHROSCOPY      MANDIBLE SURGERY      REPAIR OF EYELID Left 6/30/2021    Procedure: REPAIR, EYELID;  Surgeon: Marion Boo MD;  Location: [facility] OR [address] [place] [address];  Service: Plastics;  Laterality: Left;    SPINE SURGERY      TONSILLECTOMY      TOTAL THYROIDECTOMY         Time Tracking:     OT Date of Treatment: 03/12/25  OT Start Time: 1415  OT Stop Time: 1440  OT Total Time (min): 25 min    Billable Minutes: Evaluation 115  and Therapeutic Activity 10 co-eval with PT     3/12/2025

## 2025-03-12 NOTE — PT/OT/SLP PROGRESS
Physical Therapy      Patient Name:  Sally Jane   MRN:  [id number]    Patient not seen today secondary to continued sensory and motor deficits from nerve block. Will follow-up in 1.5 hrs.

## 2025-03-12 NOTE — ANESTHESIA PROCEDURE NOTES
Spinal    Diagnosis: right knee osteoarthritis  Patient location during procedure: OR  Start time: 3/12/2025 9:17 AM  Timeout: 3/12/2025 9:16 AM  End time: 3/12/2025 9:25 AM    Staffing  Authorizing Provider: Naun Davies II, MD  Performing Provider: Naun Davies II, MD    Staffing  Performed by: Naun Davies II, MD  Authorized by: Naun Davies II, MD    Preanesthetic Checklist  Completed: patient identified, IV checked, site marked, risks and benefits discussed, surgical consent, monitors and equipment checked, pre-op evaluation and timeout performed  Spinal Block  Patient position: sitting  Prep: Betadine  Patient monitoring: heart rate, cardiac monitor and continuous pulse ox  Approach: midline  Location: L3-4  Injection technique: single shot  CSF Fluid: clear free-flowing CSF  Needle  Needle type: Rolf   Needle gauge: 25 G  Needle length: 3.5 in  Additional Documentation: no paresthesia on injection and negative aspiration for heme  Needle localization: anatomical landmarks  Assessment  Ease of block: easy and difficult  Patient's tolerance of the procedure: comfortable throughout block and no complaints  Additional Notes  Due to osteoarthritic change, difficulty with placement of spinal after multiple attempts, therefore CSE performed with Tuohy. SHARON to air at 7cm then dura penetrated with spinal needle.  Still with little difficulty but was able to observe CSF.  2ml of .75% bupivacaine then injected intrathecally.

## 2025-03-12 NOTE — PLAN OF CARE
Case Management Final Discharge Note    Discharge Disposition: home with [facility]  I provided the patient a choice of post acute providers and offered a list of CMS rated HH's   Patient/family chose the following as their preferred providers:   [facility] New DME ordered / company name: RW patient does not need a RW she has one at home    Relevant SDOH / Transition of Care Barriers:  none    Patient and family educated on discharge services and updated on DC plan. Bedside RN notified, patient clear to discharge from Case Management Perspective.        03/12/25 1043   Final Note   Assessment Type Final Discharge Note   Anticipated Discharge Disposition [facility]   Hospital Resources/Appts/Education Provided Appointments scheduled and added to AVS   Post-Acute Status   Post-Acute Authorization Home [facility]   Home Health Status Set-up Complete/Auth obtained   Discharge Delays None known at this time

## 2025-03-12 NOTE — H&P
NEW PATIENT ORTHOPAEDIC: Knee    PRIMARY CARE PHYSICIAN: Reid Fuentes MD   REFERRING PROVIDER: Chucky Salcedo MD  [address] [facility]  [place]  [place] [address]     ASSESSMENT & PLAN:    Impression:  Right Knee Severe Degenerative Osteoarthritis, Primary     Follow Up Plan: 3 weeks after surgery     Surgery:    Sally Jane has radiograph and physical exam evidence of the aforementioned impression and wishes to pursue surgery. This patient appears to have sufficient symptoms to warrant surgical intervention and is an appropriate candidate for right Primary Total Knee Arthroplasty as evidenced by months of unsuccessful non-operative treatment as outlined in the HPI below and progressive symptoms.    We had a lengthy discussion regarding the risk and benefit of surgery, the alternatives, limitations and personnel involved. These included but were not limited to infection, persistent pain, instability, nerve injury, blood clots, loosening and medical complications. We also discussed the pre-operative course, surgery itself and rehabilitation.    Mae-operative blood management and transfusion issues were discussed, and options clearly outlined. The patient has consented to the use of the banked allogenic blood if medically necessary.    The patient has elected to schedule surgery at this time or intends to call the office with a surgical date. Shared decision making occurred while obtaining informed consent. The patient will be scheduled for a pre-operative education class at which time they will have their nasal swab completed and will be given CHG cloths along with the verbal and written instructions for their use.    We will plan for use of [place] cemented CR components, with ROSY assistance.     Tentative Surgery Date: 3/12/25. Same Day    Patient has a long history of having right knee pain.  She has seen Dr. Barboas for her orthopedic care prior to today.  She had changes of insurance in his looking to  have surgical consultation with regard to this right knee.  She has undergone steroid as well as viscosupplementation injections into her knee.  Her last Visco series was about 2 months ago.  He has fail to provide long-lasting relief.  She has radiographs today that demonstrate severe end-stage arthritis of her lateral compartment with the associated valgus deformity.  Because she has failed multiple nonoperative treatment modalities I think the surgeries a reasonable option for her.    The patient has been ordered:  CT (ROSY Protocol)    CONSULTS:   Anesthesia for pre-surgical optimization    ACTIVE PROBLEM LIST  Patient Active Problem List   Diagnosis    Prediabetes    Acquired hypothyroidism    Bariatric surgery status    Chronic fatigue and malaise    Cystocele with rectocele    Class 2 severe obesity due to excess calories with serious comorbidity and body mass index (BMI) of 37.0 to 37.9 in adult    Mucinous carcinoma of skin, left upper eyelid    Pure hypercholesterolemia    Adjustment reaction with anxiety and depression    Hepatitis B core antibody positive, IGM           SUBJECTIVE    CHIEF COMPLAINT: Knee Pain    HPI:   Sally Jane is a 64 y.o. female here for evaluation and management of right knee pain. There is not a specific incident that brought about this pain. she has had progressive problems with the knee(s) starting 3 years ago but is now progressing to interfere with activities which include: walking, functional household ADL's, participating in family activities, and enjoying hobbies    Currently the pain in the joint is rated at moderate with activity. The pain is constant and is located in the knee, located laterally. The pain is described as aching, severe, sharp, and throbbing. Relieving factors include rest, over the counter medication , and repositioning.     There is associated Clicking and Popping.     Sally Jane has no additional complaints.     PROGRESSIVE  SYMPTOMS:  Pain worsened by weight bearing  Pain effecting living situation    FUNCTIONAL STATUS:   Walk a block or two on level ground     PREVIOUS TREATMENTS:  Medical: OTC NSAIDS, Steroid Injections, Biologic Injections, and Tylenol  Physical Therapy: Activities Modified   Previous Orthopaedic Surgery: Right Knee Arthroscopsy    REVIEW OF SYSTEMS:  PAIN ASSESSMENT:  See HPI.  MUSCULOSKELETAL: See HPI.  OTHER 10 point review of systems is negative except as stated in HPI above    PAST MEDICAL HISTORY   has a past medical history of Asthma, Bariatric surgery status, Chronic fatigue and malaise, Hypercholesterolemia (08/20/2021), Hypothyroidism, unspecified, Mucinous carcinoma of skin, left upper eyelid (05/24/2021), PONV (postoperative nausea and vomiting), Prediabetes, and Unspecified chronic bronchitis.     PAST SURGICAL HISTORY   has a past surgical history that includes Hysterectomy; Spine surgery; Mandible surgery; Total thyroidectomy; Knee arthroscopy; Tonsillectomy; Adenoidectomy; gastric sleeve; Back surgery; Breast biopsy (Left); Closure of defect of Mohs procedure (Left, 6/30/2021); Canthoplasty (Left, 6/30/2021); and Repair of eyelid (Left, 6/30/2021).     FAMILY HISTORY  family history includes Cancer in her mother; Diabetes in her mother, sister, sister, and sister; Heart disease in her brother and father; Hepatitis in her sister; Hypertension in her brother and sister; No Known Problems in her brother, brother, daughter, and son.     SOCIAL HISTORY   reports that she has never smoked. She has never used smokeless tobacco. She reports current alcohol use of about 2.0 standard drinks of alcohol per week. She reports that she does not use drugs.     ALLERGIES   Review of patient's allergies indicates:   Allergen Reactions    Codeine      Edema      Lipitor [atorvastatin] Hives    Sulfa (sulfonamide antibiotics)         MEDICATIONS  No current facility-administered medications on file prior to encounter.      Current Outpatient Medications on File Prior to Encounter   Medication Sig Dispense Refill    aspirin (ECOTRIN) 81 MG EC tablet Take 1 tablet (81 mg total) by mouth once daily.  0    cholecalciferol, vitamin D3, (VITAMIN D3) 50 mcg (2,000 unit) Tab Take 1 tablet (2,000 Units total) by mouth once daily.      ezetimibe (ZETIA) 10 mg tablet Take 1 tablet (10 mg total) by mouth nightly. 90 tablet 3    pravastatin (PRAVACHOL) 10 MG tablet Take 10 mg by mouth once daily.      SYNTHROID 75 mcg tablet Take 1 tablet (75 mcg total) by mouth before breakfast. 90 tablet 3          PHYSICAL EXAM   weight is 93.3 kg (205 lb 11.2 oz).   Body mass index is 35.31 kg/m².      All other systems deferred.  GENERAL:  No acute distress  HABITUS: Normal  GAIT: Antalgic  SKIN: Normal , Well healed surgical incision, and No erythema, warmth, fluctuance   RRR  Non labored breathing    KNEE EXAM:    right:   Effusion: Small joint effusion  TTP: Yes over Lateral Joint Line   No crepitus with passive knee ROM  Passive ROM: Extension 0, Flexion 130  No pain with manipulation of patella  Stable to varus/valgus stress. No increased laxity to anterior/posterior drawer testing  negative Barak's test  No pain with IR/ER rotation of the hip  5/5 strength in knee flexion and extension, ankle plantarflexion and dorsiflexion  Neurovascular Status: Sensation intact to light touch in Sural, Saphenous, SPN, DPN, Tibial nerve distribution  2+ pulse DP/PT, normal capillary refill, foot has normal warmth    DATA:  Diagnostic tests reviewed for today's visit:     4v of the knee reveal Severe degenerative changes of the Lateral and Patellofemoral compartment. There is evidence of advanced osteoarthritis changes with Subchondral sclerosis, Osteophyte formation, and Joint space narrowing. The limb is in valgus alignment. The patella is tracking midline.

## 2025-03-12 NOTE — OP NOTE
OPERATIVE NOTE     PATIENT NAME: Sally Jane   MRN: [id number]      Surgery Date:3/12/25    Surgeon(s) and Assistant(s): Chucky Salcedo [facility]. [facility]     Procedure(s): right Primary Total Knee Arthroplasty, ROSY     BMI:  Body mass index is 35.31 kg/m².      Anesthesia:  Spinal     Attestation:   I was present for all of the critical portions of the operation and performed all critical portions.  The medical assistant performed the superficial closure under supervision, and assisted during the operation. I was immediately available for the duration of the entire case.      Preoperative Diagnosis:  right  Knee Arthritis     Postoperative Diagnosis: Same     Findings:  See Full Operative Report    Complications: None     EBL: 25 mL     Implants:   Implant Name Type Inv. Item Serial No.  Lot No. LRB No. Used Action   PIN BONE [id number] - [id number]  PIN BONE [id number]  [employer].  Right 1 Implanted and Explanted   PIN BONE 4 X 140MM STERILE - [id number]  PIN BONE 4 X 140MM STERILE  [employer]  Right 1 Implanted and Explanted   CEMENT BONE SMPLX HV GENTMYCN - [id number]  CEMENT BONE SMPLX HV GENTMYCN  [employer]. [id number] Right 1 Implanted   CEMENT BONE SMPLX HV GENTMYCN - [id number]  CEMENT BONE SMPLX HV GENTMYCN  [employer]. [id number] Right 1 Implanted   FEMORAL CEMENTED #4 RIGHT - [id number]  FEMORAL CEMENTED #4 RIGHT  [employer]. [id number] Right 1 Implanted   BASEPLATE TIB BONI PRIM SZ 4 - [id number]  BASEPLATE TIB BONI PRIM SZ 4  [employer]. [id number] Right 1 Implanted   INSERT TIBIAL CS SIZE 4 9MM - [id number]  INSERT TIBIAL CS SIZE 4 9MM  [employer]. [id number] Right 1 Implanted       Drains: None     Problem List:   Problem List Items Addressed This Visit    None  Visit Diagnoses         Primary osteoarthritis of right knee    -  Primary    Relevant Medications    oxyCODONE  (ROXICODONE) 5 MG immediate release tablet    pregabalin (LYRICA) 75 MG capsule    Other Relevant Orders    Diet NPO    OT evaluate and treat    PT evaluate and treat    Activity as tolerated    Keep surgical extremity elevated when not ambulating (Completed)    Lifting restrictions    Weight bearing as tolerated    No driving, operating heavy equipment or signing legal documents while taking pain medication    Call MD for:  temperature >100.4    Call MD for:  persistent nausea and vomiting    Call MD for:  severe uncontrolled pain    Call MD for:  difficulty breathing, headache or visual disturbances    Call MD for:  redness, tenderness, or signs of infection (pain, swelling, redness, odor or green/yellow discharge around incision site)    Call MD for:  hives    Call MD for:  persistent dizziness or light-headedness    Call MD for:  extreme fatigue    Place in Outpatient (Completed)    WALKER FOR HOME USE    Place sequential compression device    Place NASIM hose      Osteoarthritis of right knee, unspecified osteoarthritis type                   OPERATIVE INDICATIONS: The patient has a long history of progressive right knee pain, arthritis, and degeneration. They has developed deformity in the knee from predominantly wear and bone loss. Non-operative treatment and injections have been attempted, but have not improved or controlled the symptoms and pain that occurs during normal daily activities. Knee motion has also become limited and is restricting the patient. A total knee arthroplasty was recommended. The risks, benefits and potential complications of the arthroplasty surgery were discussed with the patient in detail. Specific details of the surgical procedure, hospitalization, recovery, rehabilitation, and long-term precautions were also presented. Pre-operative teaching was provided. Implant/prosthesis selection was outlined, and the many options available were explained; the final choice will be made at the  time of the procedure to match the anatomy and condition of the bone, ligaments, tendons, and muscles.      The patient was seen by Internal Medicine/Anesthesia for pre-operative optimization, and risk assessment. Mae-operative blood management and the potential for blood transfusion were discussed with risks and options clearly outlined. We discussed the risks of the procedure in detail, which included but is not limited to infection, bleeding, scarring, injury to blood vessels, nerves, muscular structures. We discussed specifically fracture and arthrofibrotic complications. Understanding of all topics was conveyed to me by the patient pre-operatively, and patient consent was given to proceed with the total knee replacement.      OPERATIVE PROCEDURE:  The patient was identified and brought into the Operating Room by the anesthesia and nursing teams. Anesthesia was successfully performed with spinal. The patient was then positioned supine on the operating room table, and all bony prominences were padded.  Intravenous antibiotic prophylaxis with Cefazolin dosing was confirmed. A tourniquet was applied to the upper thigh. A full knee exam was done after anesthesia was in full effect.  The leg was prepped and draped in the usual sterile fashion.  A surgical time-out was performed immediately preceding the incision with all personnel in the operating room; the patient identity was again confirmed, the knee-surgical site and extremity were identified and confirmed, X-rays were reviewed, and availability of the appropriate surgical equipment was established. The knee was exsanguinated and exposed using a limited anterior-midline skin incision. 1g of TXA was administed. Dissection was carried down through skin and subcutaneous tissue to the extensor mechanism with a scalpel.  A median parapatellar arthrotomy was made to enter the knee space sharply. A large amount of normal appearing joint fluid was encountered and  suctioned.  The synovium was thickened, hypertrophic, and inflamed. A partial synovectomy was performed for exposure, and the medial and lateral gutters were cleared of scar and synovial reflections. The superficial medial collateral ligament was carefully elevated off. The patellar synovial reflections were released and the patella exposed to reveal minimal wear through the articular surface. The patella was then subluxed laterally. The femoral and tibial arrays were placed as well as the checkpoints.     The knee was then flexed up to 90 degrees.  Assessment of the knee joint revealed severe end-stage articular damage with no remaining medial/lateral weight bearing cartilage.  The compartment was severely eburnated with bone loss on the posterior tibia and posterior femoral condyles, resulting in the valgus deformity.  The anterior cruciate ligament was found to be functionally compromised and it was excised.  Assessment of the soft tissues were made utilizing 1-6 mm feeler gauges in flexion and extension.  Changes to the preoperative plan were then carried out.     Reassessment of the soft tissue balance was carried out to confirm equal tension in flexion, extension, and medial/lateral balance. The robotic arm was then utilized to execute the plan. We began with the distal femur and chamfer cut. Then blade was swtiched and the remaining femur cuts were excuted. Then the tibia was cut. Soft tissue structures were protected with Z-retractors, ranawat. Osteophytes were then further debrided from the femur and the tibia. Degenerative meniscal remnants were excised medially and laterally. We cleaned up posterior osteophytes with curved osteotome and currette. A portion of the pain cocktail was injected into the posterior capsule. Trailing was then initated. Femur trial was placed, then the knee was then brought to extension and the appropriate sized tibial spacer block was placed. This brought the knee to full  extension, mid flexion, and 130 degrees of flexion with excellent medial lateral balance. The tray was rotated so that its midpoint was at the junction of middle one-third and lateral two-thirds of the tibial tubercle. Punches and pins fixed the trial in this position. Check points and arrays were removed. No instability.  Patella tracked midline. The PCL was intact and function, with a solid endoint to posterior drawer testing.     Punches and drills were then placed through the trials to accommodate the final implants.  All trials were removed.  The wound was copiously lavaged with a pulse irrigation/suction system. This included a solution of dilute betadine. The posterior recess of the knee and areas of known bleeding were treated with the electrocautery to reduce post-operative bleeding. The cut bone surfaces were then irrigated again, suctioned, and dried. The final implants were placed, tibia followed by femur. The final CS polyethylene was impacted into place. This was a the knees were held in extension until the cement cured. The tourniquet was released and no excessive bleeding was encountered. Synovial bleeding was further treated with the electrocautery.  The wound was again irrigated. The extensor mechanism and capsule was then anatomically closed with Stratafix suture.  Knee stability and range of motion with the capsule closed was excellent, and range of motion was 0 to 130 without excessive stress on the repair. Instrument and sponge count was completed and confirmed correct. Deep and superficial subcutaneous tissue was closed with interrupted 2-0 Vicryl suture. A running 4-0 Monocryl subcuticular stitch was used to re-approximate the skin edges. Dermabond adhesive was applied.  A sterile silver impregnated dressing was placed.  PAS stockings were placed.  The patient tolerated the procedure, was transferred from the operating room table to a hospital bed, and taken to Recovery/PACU in stable  condition.     PAIN COCKTAIL: VANNESSA Formula     POST OPERATIVE PLAN:  Patient will be transferred to the floor once in stable condition and after radiographs confirm no immediate complications. The patient will be treated with multimodal pain management protocols. They will be placed on anticoagulation of ASA 81mg to start on POD1. The patient will be weight bearing as tolerated. They will work with physical therapy, have a graduated diet, and once medially stable and safe for home can be discharged. Patient will follow up with me 3 weeks post operatively. Dressing should be removed by patient 7 days post operatively.    IMPLANTS:  Laura Triathlon  Femur: #4 CR, cemented  Tibia: #4, cemented  Poly: 9mm CS  Patella: unresurfaced

## 2025-03-12 NOTE — PLAN OF CARE
Discharge Orders: Home Primo         Expected Discharge Date: POD1    Diagnoses:  Post-op knee replacement    Patient is homebound due to:   Pt requires home health services due to taxing effort to leave the home as a result of immobility from Post-op knee replacement    Weight Bearing Status:   full weight bearing: Progress to cane as able.  Set up for outpatient PT as soon as able after 2 weeks, once patient is MOD I with cane.    Physical Therapy:   3 times a week for 2 weeks (Call for further orders beyond 2 weeks)  - Ambulate with a rolling walker  - Progress to cane  - Instruct on ROM and strengthening of knee    Aide to provide assistance with personal care and  ADLs 2 times a week for 2 weeks    Wound Care:   Surgical dressing change: Starting on  post op day 7-10, dressing can be moved. Incision is glued. Protective dressing should be placed with island dressing or apply gauze and cover with tegaderm.  Pt may shower if surgical dressing is waterproof, once removed on POD7 can shower but protect surgical dressing from getting wet by using press and seal saranwrap or garbage bag. Removal and replacement of dressing after shower only needed if incision is suspected to have gotten wet during shower. No soaking in the tub or hot tub use for 6 weeks.    Cold therapy/Ice: Encouraged at least 20 minutes 2-3 times daily or more if desired.  Incision must be kept waterproof while icing.  Wear TEDS Bilateral Thigh High Stockings for 3 weeks. Mitchell hose x 3 weeks. Ok to remove mitchell hose 1-2 hours/day max if desired.       Contact:  Please contact [phone] with concerns.         BLOOD THINNER:    If sent home on Lovenox: 28 days post-op for TKR/PLACIDO  If sent home home on ASA: 81mg   BID x 4 weeks   If on Plavix, ok to stop hospital prescribed blood thinner and restart Plavix on POD5 after surgery    Once finished with prescribed blood thinner, patients can return to pre-surgical ASA dosage if they took ASA before  surgery.       Outpatient Therapy: [facility] is preference (to begin 2 weeks post op)  [address] [facility]  [place] [id number]      DME:  - Per PT/OT recommendation

## 2025-03-12 NOTE — DISCHARGE INSTRUCTIONS
Post-Operative Discharge Instructions: Dr. Salcedo    Physical Therapy  You will have home health/physical therapy working with you 2-3x a week for the first two weeks. After this, it will be necessary to begin formal outpatient physical therapy for an additional 2 weeks for hip replacements and about 4-6 weeks for knee replacements.   Knee replacements can get stiff and as such the post operative therapy is critical after a knee replacement. Range of motion exercises are not limited to therapy sessions and should be done every 1-2 hours on your own.   Hip replacements your therapy for the first month is mostly walking and gradually returning to activities as tolerated. However, you will have to wean off assistive devices and maintain hip precautions for 6 weeks post operatively.     Pain Management  Some degree of pain is normal and expected. You will improve gradually as your muscles become stronger and healing continues. This speed of recovery is different for every patient and you should not compare your recovery to another friend or relative.   You will be discharged with pain medications. You should wean off of these as tolerated by 4-6 weeks. but it is expected you will need them in the immediate post operative period and for therapy.   Pain medications can have common side effects of constipation which you should take a laxative, nausea which you should take medication with food, itching which can be alleviated with Benadryl, and confusion which you should stop taking pain medications and call our office if this occurs.   Be aware of your ingestion of Tylenol if your pain medication has this in it, you should not exceed 3000mg of Acetaminophen as this can damage your liver.   If you require a pain medication refill, you will need to contact our office at least 3 days in advance to prescription running out. Prescriptions cannot be called into a pharmacy. You will need to  a prescription in one of our  office locations depending on our clinic availability.     Swelling  You will have swelling of the post operative leg. Swelling may get worse with activity. It will likely get worse in the 2-3 days after discharge from the hospital. Typically swelling is correlated to pain. It can be helpful to elevate your extremity above the level of your heart and consistent use of ice. Elevating your extremity should not be done as to violate your hip precautions after a hip replacement, and no pillows should be placed under the knee after knee replacement surgery.   You will have compression stockings that should remain on for 3 weeks following surgery. They should only be removed for hygiene purposes. These will help with the swelling.       Anticoagulation  You will be placed on a blood thinner to prevent blood clots. You will need to take this as directed.        Wound Care  Your dressing should be changed after 7 days and every few days thereafter. You may shower over this dressing but it should be covered or kept dry (el wrap or garbage bag). We will remove a portion of your bottom glue/gauze dressing at your follow up appointment. You can continue to shower but NO scrubbing the incision, should pat dry. NO soaking the wound in a bathtub, hot tub, pool, ocean etc. for at least 6 weeks after surgery. Your incision will likely be glued on the skin and no sutures should need to be removed post operatively.  You can begin putting on Vitamin E based lotions on the wound around 6-8 weeks post operatively when there is no remaining scabbing of the incision.   If there is any drainage post operatively you should contact our office immediately    Antibiotics  You will need to be placed on prophylactic antibiotics prior to any dental procedure or cleanings, any colonoscopy, cystoscopy, prostate or bladder surgery, kidney surgery or endoscopy. This will avoid risk of subsequent infection of your replacement. We prefer Amoxicillin  2g one hour prior to procedure. This can be given by our office or your dentist. Although controversial, we prefer lifetime dental prophylaxis.     Other Considerations  No additional anti-inflammatories should be used for 3 weeks following surgery  No driving for about 2-4 weeks following surgery on the left leg and about 4-6 weeks after surgery on the right leg. You will need to be off pain medications completely. You will need to be able to perform evasive driving maneuvers without hesitation.    You can fly about 2 weeks post-operatively. However, we may recommend alterative blood thinners if this will take place.   Return to work is patient specific. If a desk job, it may be 2-4 weeks for motivated individuals. Patients in strenuous or physical jobs may be out for 12 weeks.     Follow-up appointments  Your first post operative visit will be about 3 weeks after surgery. You will have x-rays at this appointment  Your second post operative visit will be about 3 months after surgery. This will be for a clinical check only unless a reason arises for an x-ray  Your third post operative visit will be about 1 year after surgery. X-rays are taken at this time.    ACTIVITY LEVEL: If you have received sedation or an anesthetic, you may feel sleepy for several hours. Rest  until you are more awake. Slowly resume your normal activities. No heavy lifting, nothing more that 10-15 pounds until surgery site is healed.      NO driving, alcoholic beverages or signing legal documents for next 24 hours or while taking pain medication    BATHING: You may shower after your dressing is removed, but no tub baths, hot tubs, saunas or swimming until you see the doctor.    DIET: You may resume your home diet. If nausea is present, increase your diet gradually with fluids and bland foods. Drink extra water for the next 48 hours.     Medications: Pain medication should be taken only if needed and as directed. IF antibiotics are prescribed,  the  medication should be taken until completed. You will be given an updated list of you medications.    CALL THE DOCTOR:  Fever over 101°F --any signs of infection including body aches, fever, chills, redness at surgery site   Severe pain that doesnt go away with medication.  Upset stomach and vomiting that is persistent  Problems urinating, unable to urinate or heavy bleeding (with or without clots)      Fall Prevention  Millions of people fall every year and injure themselves. You may have had anesthesia or sedation which may increase your risk of falling. You may have health issues that put you at an increased risk of falling.     Here are ways to reduce your risk of falling.    Make your home safe by keeping walkways clear of objects you may trip over.  Use non-slip pads under rugs. Do not use area rugs or small throw rugs.  Use non-slip mats in bathtubs and showers.  Install handrails and lights on staircases.  Do not walk in poorly lit areas.  Do not stand on chairs or wobbly ladders.  Use caution when reaching overhead or looking upward. This position can cause a loss of balance.  Be sure your shoes fit properly, have non-slip bottoms and are in good condition.   Wear shoes both inside and out. Avoid going barefoot or wearing slippers.  Be cautious when going up and down stairs, curbs, and when walking on uneven sidewalks.  If your balance is poor, consider using a cane or walker.  If your fall was related to alcohol use, stop or limit alcohol intake.   If your fall was related to use of sleeping medicines, talk to your doctor about this. You may need to reduce your dosage at bedtime if you awaken during the night to go to the bathroom.    To reduce the need for nighttime bathroom trips:  Avoid drinking fluids for several hours before going to bed  Empty your bladder before going to bed  Men can keep a urinal at the bedside  Stay as active as you can. Balance, flexibility, strength, and endurance all come  from exercise. They all play a role in preventing falls. Ask your healthcare provider which types of activity are right for you.  Get your vision checked on a regular basis.  If you have pets, know where they are before you stand up or walk so you don't trip over them.  Use night lights.    You will be sent home with a polar care wrap as part of your care. It will be hooked up to an ice pump.  For the first 4 days , use the machine for 30 minutes on and then 30 minutes off. ( or  time and or number of days specified by your doctor)   . You may use it after day 5 to use as needed for pain control. Be very cautious when anesthesia has given you a block to numb your extremity to monitor the ice therapy to avoid thermal injury to your skin. ( Do not leave the machine on , turn it on and off  at the 30 minute intervals and use a towel or cloth under the wrap to protect the skin )      To fill the unit:         Unlock the handles and remove lid        Fill with cold water to the indicated line and then with ice to the indicated line        Replace the lid and lock         Keep unit upright   To use the polar care:         Before turning on make sure the pad is connected         Plug in -- this will turn the unit on   Refilling:         UNPLUG unit to turn OFF         Disconnect the wrap by pushing down on the metal tab and gently pulling the connectors apart         Drain unit and refill          Reattach connector by pushing down on metal piece and pushing the connectors together   To reapply the wrap  follow directions given to you or follow the numbers on the wrap 1,2,3

## 2025-03-12 NOTE — PLAN OF CARE
Problem: Occupational Therapy  Goal: Occupational Therapy Goal  Description: Patient ready for discharge and no occupational therapy or DME needed for home. Occupational therapy to sign off.   Outcome: Met

## 2025-03-12 NOTE — TRANSFER OF CARE
Anesthesia Transfer of Care Note    Patient: Sally Jane    Procedure(s) Performed: Procedure(s) (LRB):  ARTHROPLASTY, KNEE, TOTAL, USING COMPUTER-ASSISTED NAVIGATION (Right)    Patient location: PACU    Anesthesia Type: MAC and spinal    Transport from OR: Transported from OR on 2-3 L/min O2 by NC with adequate spontaneous ventilation    Post pain: adequate analgesia    Post assessment: no apparent anesthetic complications and tolerated procedure well    Post vital signs: stable    Level of consciousness: lethargic, responds to stimulation, awake and alert    Nausea/Vomiting: no nausea/vomiting    Complications: none    Transfer of care protocol was followed    Last vitals: Visit Vitals  /82 (BP Location: Left arm, Patient Position: Lying)   Pulse (!) 52   Temp 36.2 °C (97.2 °F) (Temporal)   Resp 15   Wt 93.3 kg (205 lb 11.2 oz)   SpO2 100%   Breastfeeding No   BMI 35.31 kg/m²

## 2025-03-12 NOTE — DISCHARGE SUMMARY
"[facility] - [place] - Surgery  Discharge Note  Short Stay    Procedure(s) (LRB):  ARTHROPLASTY, KNEE, TOTAL, USING COMPUTER-ASSISTED NAVIGATION (Right)      OUTCOME: Patient tolerated treatment/procedure well without complication and is now ready for discharge.    DISPOSITION: Home-Health Care [facility] [place]    FINAL DIAGNOSIS:  Right Knee OA    FOLLOWUP: In clinic    DISCHARGE INSTRUCTIONS:     Justification of Rolling Walker:  The mobility limitation can not be sufficiently resolved by the use of a cane.  Patient's functional mobility deficit can be sufficiently resolved with the use of a rolling walker.  Patient has mobility limitation significantly impairs their ability to participate in 1 or more activities of daily living.  The use of the rolling walker we will significantly improve the patient's ability to participate in MRADLS and the patient will use it on a regular basis in the home.     Discharge Procedure Orders   WALKER FOR HOME USE     Order Specific Question Answer Comments   Type of Walker: Adult (5'4"-6'6")    With wheels? Yes    Height: 5'4    Weight: 93.3 kg (205 lb 11.2 oz)    Length of need (1-99 months): 99    Please check all that apply: Walker will be used for gait training.      Activity as tolerated     Keep surgical extremity elevated when not ambulating     Lifting restrictions   Order Comments: No strenuous exercise or lifting of > 10 lbs     Weight bearing as tolerated     No driving, operating heavy equipment or signing legal documents while taking pain medication     Call MD for:  temperature >100.4     Call MD for:  persistent nausea and vomiting     Call MD for:  severe uncontrolled pain     Call MD for:  difficulty breathing, headache or visual disturbances     Call MD for:  redness, tenderness, or signs of infection (pain, swelling, redness, odor or green/yellow discharge around incision site)     Call MD for:  hives     Call MD for:  persistent dizziness or light-headedness     Call MD for:  extreme " fatigue        TIME SPENT ON DISCHARGE: 20 minutes

## 2025-03-12 NOTE — PLAN OF CARE
Problem: Physical Therapy  Goal: Physical Therapy Goal  Outcome: Progressing     Pt ambulated ~100 ft with CGA using RW.  Pt to be D/C'ed home today with low intensity therapy and caregiver support.  Pt has all DME's.

## 2025-03-13 ENCOUNTER — TELEPHONE (OUTPATIENT)
Dept: ORTHOPEDICS | Facility: CLINIC | Age: 65
End: 2025-03-13
Payer: MEDICARE

## 2025-03-13 DIAGNOSIS — Z96.651 STATUS POST RIGHT KNEE REPLACEMENT: Primary | ICD-10-CM

## 2025-03-13 NOTE — TELEPHONE ENCOUNTER
Called the patient today regarding her surgery with Dr. Chucky Salcedo . The patient had a right TKA on 3/12/25.     [facility]. Was calling to check on her she can call the clinic with any questions or issues.      The patient was informed that if they have post op EMERGENCY's  to call the orthopedic Post-op Hot Line at [phone] .The patient was reminded that if they have any chest pain or shortness of breath to call 911 or go to the ER.    Anna Flores, YI  Clinical- OR Assistant to Dr. Chucky Salcedo  [facility]  [phone]

## 2025-03-13 NOTE — ANESTHESIA POSTPROCEDURE EVALUATION
Anesthesia Post Evaluation    Patient: Sally Jane    Procedure(s) Performed: Procedure(s) (LRB):  ARTHROPLASTY, KNEE, TOTAL, USING COMPUTER-ASSISTED NAVIGATION (Right)    Final Anesthesia Type: spinal      Patient location during evaluation: PACU  Patient participation: Yes- Able to Participate  Level of consciousness: awake and alert  Post-procedure vital signs: reviewed and stable  Pain management: adequate  Airway patency: patent    PONV status at discharge: No PONV  Anesthetic complications: no      Respiratory status: spontaneous ventilation and room air  Hydration status: euvolemic  Follow-up not needed.              Vitals Value Taken Time   /56 03/12/25 17:28   Temp 36.7 °C (98 °F) 03/12/25 17:28   Pulse 52 03/12/25 17:28   Resp 17 03/12/25 17:28   SpO2 97 % 03/12/25 17:28         Event Time   Out of Recovery 17:24:00         Pain/Edwige Score: Pain Rating Prior to Med Admin: 5 (3/12/2025  3:00 PM)  Pain Rating Post Med Admin: 6 (3/12/2025 12:35 PM)  Edwige Score: 10 (3/12/2025  5:28 PM)

## 2025-03-14 ENCOUNTER — TELEPHONE (OUTPATIENT)
Dept: ORTHOPEDICS | Facility: CLINIC | Age: 65
End: 2025-03-14
Payer: MEDICARE

## 2025-03-14 ENCOUNTER — PATIENT MESSAGE (OUTPATIENT)
Dept: ORTHOPEDICS | Facility: CLINIC | Age: 65
End: 2025-03-14
Payer: MEDICARE

## 2025-03-14 VITALS
DIASTOLIC BLOOD PRESSURE: 56 MMHG | SYSTOLIC BLOOD PRESSURE: 115 MMHG | HEART RATE: 52 BPM | WEIGHT: 205.69 LBS | RESPIRATION RATE: 17 BRPM | TEMPERATURE: 98 F | BODY MASS INDEX: 35.31 KG/M2 | OXYGEN SATURATION: 97 %

## 2025-03-14 DIAGNOSIS — Z96.651 STATUS POST RIGHT KNEE REPLACEMENT: Primary | ICD-10-CM

## 2025-03-14 NOTE — TELEPHONE ENCOUNTER
Attempted to reach back out to patient, went straight to voicemail again, Out Patient PT orders were placed yesterday.      I did let the patient know I am not able to ever reach her. Maybe portal messaging is the best solution as I do need to be able to contact her.      ----- Message from Erwin sent at 3/14/2025  9:21 AM CDT -----  Regarding: Sally  Type: Patient Callback Who called: Sally What is the request in detail: Patient had surgery on 03/12/2025. She just talked to the Home Health provider and they are sayin that there are no orders in for Physical Therapy for the patient till 03/31. Please reach out to the Home Health and the patient as soon as possible. Can the clinic reply by [facility]? Yes Would the patient rather a call back or a response via [facility]? Callback Best call back number: .[phone] Information:

## 2025-03-26 DIAGNOSIS — M17.11 PRIMARY OSTEOARTHRITIS OF RIGHT KNEE: ICD-10-CM

## 2025-03-26 RX ORDER — OXYCODONE HYDROCHLORIDE 5 MG/1
5-10 TABLET ORAL EVERY 4 HOURS PRN
Qty: 40 TABLET | Refills: 0 | Status: SHIPPED | OUTPATIENT
Start: 2025-03-26

## 2025-03-31 DIAGNOSIS — M17.11 PRIMARY OSTEOARTHRITIS OF RIGHT KNEE: Primary | ICD-10-CM

## 2025-04-01 ENCOUNTER — TELEPHONE (OUTPATIENT)
Dept: ORTHOPEDICS | Facility: CLINIC | Age: 65
End: 2025-04-01
Payer: MEDICARE

## 2025-04-01 DIAGNOSIS — Z96.651 STATUS POST RIGHT KNEE REPLACEMENT: Primary | ICD-10-CM

## 2025-04-01 NOTE — TELEPHONE ENCOUNTER
--Patient contacted the office yesterday requesting dynamic referral. Referral placed and sent for scheduling asap.

## 2025-04-03 ENCOUNTER — OFFICE VISIT (OUTPATIENT)
Dept: ORTHOPEDICS | Facility: CLINIC | Age: 65
End: 2025-04-03
Payer: MEDICARE

## 2025-04-03 ENCOUNTER — APPOINTMENT (OUTPATIENT)
Dept: RADIOLOGY | Facility: HOSPITAL | Age: 65
End: 2025-04-03
Attending: ORTHOPAEDIC SURGERY
Payer: MEDICARE

## 2025-04-03 VITALS — HEIGHT: 64 IN | WEIGHT: 205.69 LBS | BODY MASS INDEX: 35.12 KG/M2

## 2025-04-03 DIAGNOSIS — M17.11 PRIMARY OSTEOARTHRITIS OF RIGHT KNEE: ICD-10-CM

## 2025-04-03 PROCEDURE — 1125F AMNT PAIN NOTED PAIN PRSNT: CPT | Mod: CPTII,S$GLB,, | Performed by: ORTHOPAEDIC SURGERY

## 2025-04-03 PROCEDURE — 3288F FALL RISK ASSESSMENT DOCD: CPT | Mod: CPTII,S$GLB,, | Performed by: ORTHOPAEDIC SURGERY

## 2025-04-03 PROCEDURE — 1159F MED LIST DOCD IN RCRD: CPT | Mod: CPTII,S$GLB,, | Performed by: ORTHOPAEDIC SURGERY

## 2025-04-03 PROCEDURE — 1101F PT FALLS ASSESS-DOCD LE1/YR: CPT | Mod: CPTII,S$GLB,, | Performed by: ORTHOPAEDIC SURGERY

## 2025-04-03 PROCEDURE — 99999 PR PBB SHADOW E&M-EST. PATIENT-LVL III: CPT | Mod: PBBFAC,,, | Performed by: ORTHOPAEDIC SURGERY

## 2025-04-03 PROCEDURE — 99024 POSTOP FOLLOW-UP VISIT: CPT | Mod: S$GLB,,, | Performed by: ORTHOPAEDIC SURGERY

## 2025-04-03 PROCEDURE — 73562 X-RAY EXAM OF KNEE 3: CPT | Mod: 26,RT,, | Performed by: RADIOLOGY

## 2025-04-03 PROCEDURE — 73562 X-RAY EXAM OF KNEE 3: CPT | Mod: TC,FY,PN,RT

## 2025-04-03 RX ORDER — PREGABALIN 75 MG/1
75 CAPSULE ORAL 2 TIMES DAILY
Qty: 28 CAPSULE | Refills: 0 | Status: SHIPPED | OUTPATIENT
Start: 2025-04-03 | End: 2025-04-17

## 2025-04-03 RX ORDER — METHOCARBAMOL 500 MG/1
500 TABLET, FILM COATED ORAL 4 TIMES DAILY
Qty: 40 TABLET | Refills: 0 | Status: SHIPPED | OUTPATIENT
Start: 2025-04-03 | End: 2025-04-13

## 2025-04-03 RX ORDER — METHYLPREDNISOLONE 4 MG/1
TABLET ORAL
Qty: 1 EACH | Refills: 0 | Status: SHIPPED | OUTPATIENT
Start: 2025-04-03

## 2025-04-03 NOTE — PROGRESS NOTES
Ortho Knee Follow Up Note    PCP: Reid Fuentes MD   Referring Provider: No referring provider defined for this encounter.        Assessment:  64 y.o. female status post right total Knee Primary completed on 3/12/25.  Patient is doing well today.  She has started outpatient physical therapy.  She is still using a walker for ambulation.  Her pain is doing reasonable.  But she does have pain when attempting to achieve full extension and flexion at this stage.  She is able to get from about 5 to 90° of flexion.  We discussed the importance over the next 6 weeks of regaining her motion.  Her swelling appears reasonable at this stage.  Her radiographs look good.  I am going to send her in some muscle relaxers which she says helping.  I am going to put her on a Medrol Dosepak to help prevent hopefully some of the scar tissue formation while she is working through some of her motion limitations.    Plan:  Follow up 3 months  Future Radiographs Indicated at next visit: No    Pain Management: Continue current pain management and Wean narcotic medications as tolerated  Anticoagulation: Continue ASA for total of 4 weeks duration post operatively  Wound Care: Ok to shower. No scrubbing incision. No soaking in pools or tubs for 6 weeks post operative.     Patient Reassurance:   Post-operative course discussed with patient. Patient reassured and supported. All questions answered.    ACTIVE PROBLEM LIST  Problem List[1]        HPI:  Sally Jane presents today for a post-op visit.     STATUS POST:  right total Knee Primary  BMI: There is no height or weight on file to calculate BMI.    Post operative recovery was complicated by: None    Patient rates his condition as improving. Pleased with surgical outcome to date.     Functional Assessment:  Started Outpatient PT  Functional Difficulties:  Stair climbing, Arising from chair, and Walking  Pain Medication:  Narcotic  Anticoagulation: Aspirin     EXAM:    right  POST-OPERATIVE KNEE    There were no vitals taken for this visit.    Skin:  Appropriate post op appearance, No evidence of erythema, warmth, discharge, or drainage, and Incision clean/dry/intact  Range of Motion: Flexion: 90, Extension 5  Neurovascular Status: Sensation intact in Sural, Saphenous, SPN, DPN and Tibial nerve distribution. 5 out of 5 strength in hip flexion, knee flexion/extension, ankle plantarflexion/dorsiflexion. 2+ dorsalis pedis    IMAGING:  X-ray Knee:   Implants are well fixed and aligned. There is no evidence of loosening.           [1]   Patient Active Problem List  Diagnosis    Prediabetes    Acquired hypothyroidism    Bariatric surgery status    Chronic fatigue and malaise    Cystocele with rectocele    Class 2 severe obesity due to excess calories with serious comorbidity and body mass index (BMI) of 37.0 to 37.9 in adult    Mucinous carcinoma of skin, left upper eyelid    Pure hypercholesterolemia    Adjustment reaction with anxiety and depression    Hepatitis B core antibody positive, IGM

## 2025-04-28 DIAGNOSIS — M17.11 PRIMARY OSTEOARTHRITIS OF RIGHT KNEE: ICD-10-CM

## 2025-04-28 RX ORDER — OXYCODONE HYDROCHLORIDE 5 MG/1
5-10 TABLET ORAL EVERY 4 HOURS PRN
Qty: 40 TABLET | Refills: 0 | Status: SHIPPED | OUTPATIENT
Start: 2025-04-28 | End: 2025-05-01

## 2025-05-01 ENCOUNTER — OFFICE VISIT (OUTPATIENT)
Dept: ORTHOPEDICS | Facility: CLINIC | Age: 65
End: 2025-05-01
Payer: COMMERCIAL

## 2025-05-01 VITALS — BODY MASS INDEX: 35.12 KG/M2 | WEIGHT: 205.69 LBS | HEIGHT: 64 IN

## 2025-05-01 DIAGNOSIS — Z96.651 STATUS POST RIGHT KNEE REPLACEMENT: Primary | ICD-10-CM

## 2025-05-01 PROCEDURE — 99999 PR PBB SHADOW E&M-EST. PATIENT-LVL III: CPT | Mod: PBBFAC,,, | Performed by: ORTHOPAEDIC SURGERY

## 2025-05-01 PROCEDURE — 99024 POSTOP FOLLOW-UP VISIT: CPT | Mod: S$GLB,,, | Performed by: ORTHOPAEDIC SURGERY

## 2025-05-01 RX ORDER — ONDANSETRON 4 MG/1
8 TABLET, ORALLY DISINTEGRATING ORAL EVERY 8 HOURS PRN
Qty: 30 TABLET | Refills: 0 | Status: SHIPPED | OUTPATIENT
Start: 2025-05-01

## 2025-05-01 RX ORDER — HYDROCODONE BITARTRATE AND ACETAMINOPHEN 5; 325 MG/1; MG/1
1-2 TABLET ORAL EVERY 6 HOURS PRN
Qty: 30 TABLET | Refills: 0 | Status: SHIPPED | OUTPATIENT
Start: 2025-05-01

## 2025-05-01 RX ORDER — CELECOXIB 200 MG/1
200 CAPSULE ORAL 2 TIMES DAILY
Qty: 14 CAPSULE | Refills: 0 | Status: SHIPPED | OUTPATIENT
Start: 2025-05-01

## 2025-05-01 NOTE — PROGRESS NOTES
Ortho Knee Follow Up Note    PCP: Reid Fuentes MD   Referring Provider: No referring provider defined for this encounter.        Assessment:  64 y.o. female status post right total Knee Primary completed on 3/12/25.  Patient is fair today.  She has started outpatient physical therapy, going 3x a week.  She is using a cane for ambulation.  Her pain is doing reasonable except in knee flexion.  But she does have pain when attempting to achieve full extension and flexion at this stage.  She is able to get from about 5 to 105° of flexion. We discussed the importance over the next 6 weeks of regaining her motion.  Her swelling appears reasonable at this stage.  Her radiographs look good.  She is I am going to hard time tolerating oxycodone pills.  Previously placed on Medrol Dosepak.  I think a more consistent anti-inflammatory and changing to hydrocodone maybe more effective for her.  Also we will place a Shirley splint order to help regain some of her extension.  She feels like she is progressing at her own pace.  She reports a difference in terms of her preop pain versus her current pain.  She reports more of a muscular stiffness kind of pain as opposed to bony ambulatory pain that she was experiencing previous to surgery.      Shirley splint to be used for posttraumatic pain in her right knee.     Plan:  Follow up 6 weeks  Future Radiographs Indicated at next visit: No    Pain Management: Continue current pain management and Wean narcotic medications as tolerated  Anticoagulation: None  Wound Care: None    Patient Reassurance:   Post-operative course discussed with patient. Patient reassured and supported. All questions answered.    ACTIVE PROBLEM LIST  Problem List[1]        HPI:  Sally Jane presents today for a post-op visit.     STATUS POST:  right total Knee Primary  BMI: Body mass index is 35.31 kg/m².    Post operative recovery was complicated by: None    Patient rates his condition as improving.  "Pleased with surgical outcome to date.     Functional Assessment:  Started Outpatient PT  Functional Difficulties:  Stair climbing, Arising from chair, and Walking  Pain Medication:  Narcotic  Anticoagulation: None    EXAM:    right POST-OPERATIVE KNEE    Ht 5' 4" (1.626 m)   Wt 93.3 kg (205 lb 11 oz)   BMI 35.31 kg/m²     Skin:  Appropriate post op appearance, No evidence of erythema, warmth, discharge, or drainage, and Incision clean/dry/intact  Range of Motion: Flexion: 105, Extension 3  Neurovascular Status: Sensation intact in Sural, Saphenous, SPN, DPN and Tibial nerve distribution. 5 out of 5 strength in hip flexion, knee flexion/extension, ankle plantarflexion/dorsiflexion. 2+ dorsalis pedis    IMAGING:  X-ray Knee:   Implants are well fixed and aligned. There is no evidence of loosening.       [1]   Patient Active Problem List  Diagnosis    Prediabetes    Acquired hypothyroidism    Bariatric surgery status    Chronic fatigue and malaise    Cystocele with rectocele    Class 2 severe obesity due to excess calories with serious comorbidity and body mass index (BMI) of 37.0 to 37.9 in adult    Mucinous carcinoma of skin, left upper eyelid    Pure hypercholesterolemia    Adjustment reaction with anxiety and depression    Hepatitis B core antibody positive, IGM     "

## 2025-05-02 DIAGNOSIS — Z96.651 STATUS POST RIGHT KNEE REPLACEMENT: Primary | ICD-10-CM

## 2025-05-13 ENCOUNTER — EXTERNAL HOME HEALTH (OUTPATIENT)
Dept: HOME HEALTH SERVICES | Facility: HOSPITAL | Age: 65
End: 2025-05-13
Payer: MEDICARE

## 2025-06-05 ENCOUNTER — OFFICE VISIT (OUTPATIENT)
Dept: ORTHOPEDICS | Facility: CLINIC | Age: 65
End: 2025-06-05
Payer: MEDICARE

## 2025-06-05 VITALS — WEIGHT: 205.69 LBS | HEIGHT: 64 IN | BODY MASS INDEX: 35.12 KG/M2

## 2025-06-05 DIAGNOSIS — Z96.651 STATUS POST RIGHT KNEE REPLACEMENT: Primary | ICD-10-CM

## 2025-06-05 PROCEDURE — 99999 PR PBB SHADOW E&M-EST. PATIENT-LVL III: CPT | Mod: PBBFAC,,,

## 2025-07-07 ENCOUNTER — OFFICE VISIT (OUTPATIENT)
Dept: ORTHOPEDICS | Facility: CLINIC | Age: 65
End: 2025-07-07
Payer: COMMERCIAL

## 2025-07-07 VITALS — HEIGHT: 64 IN | WEIGHT: 205.69 LBS | BODY MASS INDEX: 35.12 KG/M2

## 2025-07-07 DIAGNOSIS — Z96.651 STATUS POST RIGHT KNEE REPLACEMENT: Primary | ICD-10-CM

## 2025-07-07 PROCEDURE — 99213 OFFICE O/P EST LOW 20 MIN: CPT | Mod: S$GLB,,,

## 2025-07-07 PROCEDURE — 99999 PR PBB SHADOW E&M-EST. PATIENT-LVL III: CPT | Mod: PBBFAC,,,

## 2025-07-07 RX ORDER — MELOXICAM 15 MG/1
15 TABLET ORAL DAILY
Qty: 30 TABLET | Refills: 0 | Status: SHIPPED | OUTPATIENT
Start: 2025-07-07

## 2025-07-07 NOTE — PROGRESS NOTES
Ortho Knee Follow Up Note    PCP: Reid Fuentes MD   Referring Provider: No referring provider defined for this encounter.        Assessment:  64 y.o. female status post right total Knee Primary completed on 3/12/25.  Still attending outpatient PT 3x week and is working hard. Feels like she is making progress, albeit slowly. Reports her pain is significantly improved compared to pre-op. Does not feel like she is ready to go back to work at this stage. She would like to extend her leave until the beginning of August.  She is reporting a new pain which she feels primarily starting at the lateral aspect of her knee - this feels like a sharp shooting pain that radiates down to her ankle.  She notices this mostly while working with physical therapy on extension.  She also endorses intermittent numbness sensation that she localizes as starting at the lateral aspect of her knee which radiates distally into her foot.  Denies any back pain.  Denies any pain/numbness/tingling proximal to the knee.  This has started over the last week or so.  Still feels like her hamstrings and calf muscles are tight.  I am going to send over meloxicam for her to see if this helps with of the pain that she is dealing with during physical therapy sessions, as she is not currently taking anything.  Plan to have her follow up with Dr. Salcedo in 4 weeks for reassessment of motion.      Plan:  Follow up with Dr. Salcedo in 4 weeks   Future Radiographs Indicated at next visit: No    Pain Management:  Meloxicam  Anticoagulation: None  Wound Care: None    Patient Reassurance:   Post-operative course discussed with patient. Patient reassured and supported. All questions answered.    ACTIVE PROBLEM LIST  Problem List[1]        HPI:  Sally Jane presents today for a post-op visit.     STATUS POST:  right total Knee Primary  BMI: Body mass index is 35.31 kg/m².    Post operative recovery was complicated by: None    Patient rates his  "condition as improving. Pleased with surgical outcome to date.     Functional Assessment:  Still in outpatient physical therapy  Functional Difficulties:  Getting in and out of car, steps  Pain Medication:  meloxicam  Anticoagulation: None    EXAM:    right POST-OPERATIVE KNEE    Ht 5' 4" (1.626 m)   Wt 93.3 kg (205 lb 11 oz)   BMI 35.31 kg/m²     Skin:  Appropriate post op appearance, No evidence of erythema, warmth, discharge, or drainage, and Incision well healed  Range of Motion: Flexion: 110, Extension 3  Neurovascular Status: Sensation intact in Sural, Saphenous, SPN, DPN and Tibial nerve distribution. 5 out of 5 strength in hip flexion, knee flexion/extension, ankle plantarflexion/dorsiflexion. 2+ dorsalis pedis    IMAGING:  X-ray Knee:   Implants are well fixed and aligned. There is no evidence of loosening.             [1]   Patient Active Problem List  Diagnosis    Prediabetes    Acquired hypothyroidism    Bariatric surgery status    Chronic fatigue and malaise    Cystocele with rectocele    Class 2 severe obesity due to excess calories with serious comorbidity and body mass index (BMI) of 37.0 to 37.9 in adult    Mucinous carcinoma of skin, left upper eyelid    Pure hypercholesterolemia    Adjustment reaction with anxiety and depression    Hepatitis B core antibody positive, IGM     "

## 2025-07-24 ENCOUNTER — TELEPHONE (OUTPATIENT)
Dept: ORTHOPEDICS | Facility: CLINIC | Age: 65
End: 2025-07-24
Payer: COMMERCIAL

## 2025-08-12 ENCOUNTER — OFFICE VISIT (OUTPATIENT)
Dept: ORTHOPEDICS | Facility: CLINIC | Age: 65
End: 2025-08-12
Payer: COMMERCIAL

## 2025-08-12 VITALS — WEIGHT: 205.69 LBS | HEIGHT: 64 IN | BODY MASS INDEX: 35.12 KG/M2

## 2025-08-12 DIAGNOSIS — Z96.651 STATUS POST RIGHT KNEE REPLACEMENT: Primary | ICD-10-CM

## 2025-08-12 PROCEDURE — 99213 OFFICE O/P EST LOW 20 MIN: CPT | Mod: S$GLB,,, | Performed by: ORTHOPAEDIC SURGERY

## 2025-08-12 PROCEDURE — 99999 PR PBB SHADOW E&M-EST. PATIENT-LVL III: CPT | Mod: PBBFAC,,, | Performed by: ORTHOPAEDIC SURGERY

## (undated) DEVICE — SUT VICRYL+ 1 CT1 18IN

## (undated) DEVICE — SUT CTD VICRYL 4-0 P-2

## (undated) DEVICE — ELECTRODE REM PLYHSV RETURN 9

## (undated) DEVICE — INSTRUMENT SURG SUCT FRZR W/C

## (undated) DEVICE — DRAPE STERI-DRAPE 1000 17X11IN

## (undated) DEVICE — CUP MEDICINE STERILE 2OZ

## (undated) DEVICE — GLOVE SIGNATURE ESSNTL LTX 7.5

## (undated) DEVICE — TAPE SURG TRANSPORE 1 SNG USE

## (undated) DEVICE — GOWN SURGICAL X-LARGE

## (undated) DEVICE — SOL 9P NACL IRR PIC IL

## (undated) DEVICE — NDL HYPO A BEVEL 30X1/2

## (undated) DEVICE — DRAPE STERI U-SHAPED 47X51IN

## (undated) DEVICE — SUT CTD VICRYL 6-0 S-14

## (undated) DEVICE — SEE MEDLINE ITEM 152487

## (undated) DEVICE — SYS CLSR DERMABOND PRINEO 22CM

## (undated) DEVICE — CORD BIPOLAR 12 FOOT

## (undated) DEVICE — KIT VIZADISC KNEE TRACKING

## (undated) DEVICE — PIN BONE 4X110MM
Type: IMPLANTABLE DEVICE | Site: KNEE | Status: NON-FUNCTIONAL
Removed: 2025-03-12

## (undated) DEVICE — TIP YANKAUERS BULB NO VENT

## (undated) DEVICE — DRAPE STERI INSTRUMENT 1018

## (undated) DEVICE — PAD EYE OVAL CNTOUR 1.62X2.62

## (undated) DEVICE — SUCTION SURGICAL STR 7FR

## (undated) DEVICE — PAD COLD THERAPY KNEE WRAP ON

## (undated) DEVICE — SHEET EENT SPLIT

## (undated) DEVICE — SYR 5CC LUER LOCK W/O NDL

## (undated) DEVICE — BLADE MAKO STANDARD

## (undated) DEVICE — NDL 22GA X1 1/2 REG BEVEL

## (undated) DEVICE — DRAPE T EXTRM SURG 121X128X90

## (undated) DEVICE — SOL WATER STRL IRR 1000ML

## (undated) DEVICE — SEE MEDLINE ITEM 146313

## (undated) DEVICE — SUT 6/0 18IN NUROLON BLK BR

## (undated) DEVICE — SPONGE COTTON TRAY 4X4IN

## (undated) DEVICE — ARMCRADLE BLUE POLY FOAM

## (undated) DEVICE — SOL NACL 0.9% IV INJ 1000ML

## (undated) DEVICE — KIT DRAPE RIO ONE PIECE W/POCK

## (undated) DEVICE — ALCOHOL 70% ANTISEPTIC ISO 4OZ

## (undated) DEVICE — BLADE MAKO NARROW

## (undated) DEVICE — BANDAGE MATRIX HK LOOP 6IN 5YD

## (undated) DEVICE — TOURNIQUET SB QC DP 34X4IN

## (undated) DEVICE — SUT STRATAFIX PDS PLS 45CM

## (undated) DEVICE — SYR 10CC LUER LOCK

## (undated) DEVICE — SUT CTD VICRYL 6-0 VIL S-14

## (undated) DEVICE — SYS SURGIPHOR STRL IRRG

## (undated) DEVICE — SEE MEDLINE ITEM 152622

## (undated) DEVICE — CLEANER TIP ELECSURG 2X2IN

## (undated) DEVICE — GLOVE SENSICARE PI GRN 8

## (undated) DEVICE — BLANKET WARMING UPPER BODY

## (undated) DEVICE — TRAY MUSCLE LID EYE

## (undated) DEVICE — PROTECTOR CORNEAL CROUCH ST

## (undated) DEVICE — BLADE SAW OSC 19.5 X 1.2 X 90

## (undated) DEVICE — APPLICATOR CHLORAPREP ORN 26ML

## (undated) DEVICE — SOL BETADINE 5%

## (undated) DEVICE — SOL BSS BALANCED SALT

## (undated) DEVICE — KIT TRIATHLON CR TIB PREP SZ4

## (undated) DEVICE — PENCIL ROCKER SWITCH 10FT CORD

## (undated) DEVICE — KIT TRIATHLON CR FEM PREP SZ4

## (undated) DEVICE — SUT 5-0 PROLENE

## (undated) DEVICE — COVER LIGHT HANDLE 80/CA

## (undated) DEVICE — DRESSING EYE OVAL LF

## (undated) DEVICE — NDL STRAIGHT 4CM LEIBINGER

## (undated) DEVICE — SYR 50CC LL

## (undated) DEVICE — SUT VICRYL PLUS 2-0 CT1 18

## (undated) DEVICE — DROPPER MEDICINE

## (undated) DEVICE — SKINMARKER & RULER REGULAR X-F

## (undated) DEVICE — DRAPE THREE-QUARTER 53X77IN

## (undated) DEVICE — PIN BONE 4 X 140MM STERILE
Type: IMPLANTABLE DEVICE | Site: KNEE | Status: NON-FUNCTIONAL
Removed: 2025-03-12

## (undated) DEVICE — Device

## (undated) DEVICE — TOWEL OR DISP STRL BLUE 4/PK

## (undated) DEVICE — FORCEP CURVED DISP

## (undated) DEVICE — APPLICATOR STERILE 3IN

## (undated) DEVICE — SUT SILK 4-0 BLK BR G-3G-3

## (undated) DEVICE — COVER OVERHEAD SURG LT BLUE

## (undated) DEVICE — SUT 6/0 18IN PROLENE BL MO

## (undated) DEVICE — SUT PLN GUT 6-0 G-1G-1 18

## (undated) DEVICE — PULSAVAC ZIMMER

## (undated) DEVICE — SOL IRR SOD CHL .9% POUR

## (undated) DEVICE — BLADE SURG #15 CARBON STEEL

## (undated) DEVICE — GAUZE SPONGE 4X4 12PLY

## (undated) DEVICE — HOOD FLYTE PEELWY STERISHIELD

## (undated) DEVICE — SUT CTD VICRYL 5-0 P-2 UNDB

## (undated) DEVICE — NDL ANES SPINAL 18X3.5ST 18G